# Patient Record
Sex: MALE | Race: WHITE | NOT HISPANIC OR LATINO | Employment: OTHER | URBAN - METROPOLITAN AREA
[De-identification: names, ages, dates, MRNs, and addresses within clinical notes are randomized per-mention and may not be internally consistent; named-entity substitution may affect disease eponyms.]

---

## 2020-12-21 ENCOUNTER — AMBULATORY - HEALTHEAST (OUTPATIENT)
Dept: SURGERY | Facility: HOSPITAL | Age: 75
End: 2020-12-21

## 2020-12-21 DIAGNOSIS — Z11.59 ENCOUNTER FOR SCREENING FOR OTHER VIRAL DISEASES: ICD-10-CM

## 2021-01-01 ENCOUNTER — LAB REQUISITION (OUTPATIENT)
Dept: LAB | Facility: HOSPITAL | Age: 76
End: 2021-01-01
Payer: MEDICARE

## 2021-01-01 ENCOUNTER — LAB REQUISITION (OUTPATIENT)
Dept: LAB | Facility: CLINIC | Age: 76
End: 2021-01-01
Payer: MEDICARE

## 2021-01-01 ENCOUNTER — LAB REQUISITION (OUTPATIENT)
Dept: LAB | Facility: CLINIC | Age: 76
End: 2021-01-01
Payer: COMMERCIAL

## 2021-01-01 ENCOUNTER — HOME INFUSION (PRE-WILLOW HOME INFUSION) (OUTPATIENT)
Dept: PHARMACY | Facility: CLINIC | Age: 76
End: 2021-01-01
Payer: COMMERCIAL

## 2021-01-01 ENCOUNTER — HEALTH MAINTENANCE LETTER (OUTPATIENT)
Age: 76
End: 2021-01-01

## 2021-01-01 ENCOUNTER — TELEPHONE (OUTPATIENT)
Dept: SURGERY | Facility: CLINIC | Age: 76
End: 2021-01-01

## 2021-01-01 ENCOUNTER — DOCUMENTATION ONLY (OUTPATIENT)
Dept: OTHER | Facility: CLINIC | Age: 76
End: 2021-01-01

## 2021-01-01 DIAGNOSIS — G45.9 TRANSIENT CEREBRAL ISCHEMIC ATTACK, UNSPECIFIED: ICD-10-CM

## 2021-01-01 DIAGNOSIS — I10 ESSENTIAL (PRIMARY) HYPERTENSION: ICD-10-CM

## 2021-01-01 DIAGNOSIS — I82.90 ACUTE EMBOLISM AND THROMBOSIS OF UNSPECIFIED VEIN: ICD-10-CM

## 2021-01-01 DIAGNOSIS — I48.20 CHRONIC ATRIAL FIBRILLATION, UNSPECIFIED (H): ICD-10-CM

## 2021-01-01 DIAGNOSIS — I82.401 ACUTE EMBOLISM AND THROMBOSIS OF UNSPECIFIED DEEP VEINS OF RIGHT LOWER EXTREMITY (H): ICD-10-CM

## 2021-01-01 DIAGNOSIS — R60.9 EDEMA, UNSPECIFIED: ICD-10-CM

## 2021-01-01 DIAGNOSIS — E87.70 FLUID OVERLOAD, UNSPECIFIED: ICD-10-CM

## 2021-01-01 DIAGNOSIS — E83.42 HYPOMAGNESEMIA: ICD-10-CM

## 2021-01-01 DIAGNOSIS — R53.1 WEAKNESS: ICD-10-CM

## 2021-01-01 LAB
ANION GAP SERPL CALCULATED.3IONS-SCNC: 13 MMOL/L (ref 5–18)
ANION GAP SERPL CALCULATED.3IONS-SCNC: 7 MMOL/L (ref 5–18)
ANION GAP SERPL CALCULATED.3IONS-SCNC: 8 MMOL/L (ref 5–18)
BASOPHILS # BLD AUTO: 0 10E3/UL (ref 0–0.2)
BASOPHILS NFR BLD AUTO: 1 %
BUN SERPL-MCNC: 12 MG/DL (ref 8–28)
BUN SERPL-MCNC: 16 MG/DL (ref 8–28)
BUN SERPL-MCNC: 20 MG/DL (ref 8–28)
CALCIUM SERPL-MCNC: 8.6 MG/DL (ref 8.5–10.5)
CALCIUM SERPL-MCNC: 8.7 MG/DL (ref 8.5–10.5)
CALCIUM SERPL-MCNC: 8.8 MG/DL (ref 8.5–10.5)
CHLORIDE BLD-SCNC: 101 MMOL/L (ref 98–107)
CHLORIDE BLD-SCNC: 104 MMOL/L (ref 98–107)
CHLORIDE BLD-SCNC: 96 MMOL/L (ref 98–107)
CO2 SERPL-SCNC: 27 MMOL/L (ref 22–31)
CO2 SERPL-SCNC: 28 MMOL/L (ref 22–31)
CO2 SERPL-SCNC: 29 MMOL/L (ref 22–31)
CREAT SERPL-MCNC: 0.66 MG/DL (ref 0.7–1.3)
CREAT SERPL-MCNC: 0.7 MG/DL (ref 0.7–1.3)
CREAT SERPL-MCNC: 0.77 MG/DL (ref 0.7–1.3)
EOSINOPHIL # BLD AUTO: 0.4 10E3/UL (ref 0–0.7)
EOSINOPHIL NFR BLD AUTO: 8 %
ERYTHROCYTE [DISTWIDTH] IN BLOOD BY AUTOMATED COUNT: 14.6 % (ref 10–15)
ERYTHROCYTE [DISTWIDTH] IN BLOOD BY AUTOMATED COUNT: 15.3 % (ref 10–15)
ERYTHROCYTE [DISTWIDTH] IN BLOOD BY AUTOMATED COUNT: 15.9 % (ref 10–15)
GFR SERPL CREATININE-BSD FRML MDRD: 89 ML/MIN/1.73M2
GFR SERPL CREATININE-BSD FRML MDRD: >90 ML/MIN/1.73M2
GFR SERPL CREATININE-BSD FRML MDRD: >90 ML/MIN/1.73M2
GLUCOSE BLD-MCNC: 100 MG/DL (ref 70–125)
GLUCOSE BLD-MCNC: 85 MG/DL (ref 70–125)
GLUCOSE BLD-MCNC: 85 MG/DL (ref 70–125)
HCT VFR BLD AUTO: 26.5 % (ref 40–53)
HCT VFR BLD AUTO: 31.2 % (ref 40–53)
HCT VFR BLD AUTO: 36 % (ref 40–53)
HGB BLD-MCNC: 11.3 G/DL (ref 13.3–17.7)
HGB BLD-MCNC: 8.1 G/DL (ref 13.3–17.7)
HGB BLD-MCNC: 9.7 G/DL (ref 13.3–17.7)
IMM GRANULOCYTES # BLD: 0 10E3/UL
IMM GRANULOCYTES NFR BLD: 0 %
INR PPP: 1.91 (ref 0.85–1.15)
INR PPP: 1.92 (ref 0.85–1.15)
INR PPP: 2.05 (ref 0.85–1.15)
INR PPP: 2.45 (ref 0.85–1.15)
INR PPP: 3.15 (ref 0.85–1.15)
INR PPP: 3.92 (ref 0.85–1.15)
LYMPHOCYTES # BLD AUTO: 1.9 10E3/UL (ref 0.8–5.3)
LYMPHOCYTES NFR BLD AUTO: 43 %
MAGNESIUM SERPL-MCNC: 2.3 MG/DL (ref 1.8–2.6)
MCH RBC QN AUTO: 29 PG (ref 26.5–33)
MCH RBC QN AUTO: 29.7 PG (ref 26.5–33)
MCH RBC QN AUTO: 29.8 PG (ref 26.5–33)
MCHC RBC AUTO-ENTMCNC: 30.6 G/DL (ref 31.5–36.5)
MCHC RBC AUTO-ENTMCNC: 31.1 G/DL (ref 31.5–36.5)
MCHC RBC AUTO-ENTMCNC: 31.4 G/DL (ref 31.5–36.5)
MCV RBC AUTO: 93 FL (ref 78–100)
MCV RBC AUTO: 95 FL (ref 78–100)
MCV RBC AUTO: 97 FL (ref 78–100)
MONOCYTES # BLD AUTO: 0.6 10E3/UL (ref 0–1.3)
MONOCYTES NFR BLD AUTO: 14 %
NEUTROPHILS # BLD AUTO: 1.5 10E3/UL (ref 1.6–8.3)
NEUTROPHILS NFR BLD AUTO: 34 %
NRBC # BLD AUTO: 0 10E3/UL
NRBC BLD AUTO-RTO: 0 /100
PLATELET # BLD AUTO: 239 10E3/UL (ref 150–450)
PLATELET # BLD AUTO: 311 10E3/UL (ref 150–450)
PLATELET # BLD AUTO: 319 10E3/UL (ref 150–450)
POTASSIUM BLD-SCNC: 3.5 MMOL/L (ref 3.5–5)
POTASSIUM BLD-SCNC: 3.9 MMOL/L (ref 3.5–5)
POTASSIUM BLD-SCNC: 4.2 MMOL/L (ref 3.5–5)
RBC # BLD AUTO: 2.72 10E6/UL (ref 4.4–5.9)
RBC # BLD AUTO: 3.27 10E6/UL (ref 4.4–5.9)
RBC # BLD AUTO: 3.89 10E6/UL (ref 4.4–5.9)
SODIUM SERPL-SCNC: 137 MMOL/L (ref 136–145)
SODIUM SERPL-SCNC: 137 MMOL/L (ref 136–145)
SODIUM SERPL-SCNC: 139 MMOL/L (ref 136–145)
WBC # BLD AUTO: 4 10E3/UL (ref 4–11)
WBC # BLD AUTO: 4.5 10E3/UL (ref 4–11)
WBC # BLD AUTO: 4.9 10E3/UL (ref 4–11)

## 2021-01-01 PROCEDURE — 85610 PROTHROMBIN TIME: CPT | Mod: ORL | Performed by: FAMILY MEDICINE

## 2021-01-01 PROCEDURE — 85025 COMPLETE CBC W/AUTO DIFF WBC: CPT | Mod: ORL | Performed by: PHYSICAL MEDICINE & REHABILITATION

## 2021-01-01 PROCEDURE — 85027 COMPLETE CBC AUTOMATED: CPT | Mod: ORL | Performed by: PHYSICIAN ASSISTANT

## 2021-01-01 PROCEDURE — 36415 COLL VENOUS BLD VENIPUNCTURE: CPT | Mod: ORL | Performed by: PHYSICIAN ASSISTANT

## 2021-01-01 PROCEDURE — 80048 BASIC METABOLIC PNL TOTAL CA: CPT | Performed by: NURSE PRACTITIONER

## 2021-01-01 PROCEDURE — P9603 ONE-WAY ALLOW PRORATED MILES: HCPCS | Mod: ORL | Performed by: PHYSICIAN ASSISTANT

## 2021-01-01 PROCEDURE — P9604 ONE-WAY ALLOW PRORATED TRIP: HCPCS | Mod: ORL | Performed by: PHYSICAL MEDICINE & REHABILITATION

## 2021-01-01 PROCEDURE — 85610 PROTHROMBIN TIME: CPT | Mod: ORL | Performed by: NURSE PRACTITIONER

## 2021-01-01 PROCEDURE — 85027 COMPLETE CBC AUTOMATED: CPT | Performed by: NURSE PRACTITIONER

## 2021-01-01 PROCEDURE — 36415 COLL VENOUS BLD VENIPUNCTURE: CPT | Mod: ORL | Performed by: FAMILY MEDICINE

## 2021-01-01 PROCEDURE — P9604 ONE-WAY ALLOW PRORATED TRIP: HCPCS | Mod: ORL | Performed by: PHYSICIAN ASSISTANT

## 2021-01-01 PROCEDURE — P9603 ONE-WAY ALLOW PRORATED MILES: HCPCS | Performed by: NURSE PRACTITIONER

## 2021-01-01 PROCEDURE — P9603 ONE-WAY ALLOW PRORATED MILES: HCPCS | Mod: ORL | Performed by: FAMILY MEDICINE

## 2021-01-01 PROCEDURE — 36415 COLL VENOUS BLD VENIPUNCTURE: CPT | Performed by: NURSE PRACTITIONER

## 2021-01-01 PROCEDURE — 80048 BASIC METABOLIC PNL TOTAL CA: CPT | Mod: ORL | Performed by: PHYSICIAN ASSISTANT

## 2021-01-01 PROCEDURE — 36415 COLL VENOUS BLD VENIPUNCTURE: CPT | Mod: ORL | Performed by: NURSE PRACTITIONER

## 2021-01-01 PROCEDURE — 85610 PROTHROMBIN TIME: CPT | Mod: ORL | Performed by: PHYSICIAN ASSISTANT

## 2021-01-01 PROCEDURE — 80048 BASIC METABOLIC PNL TOTAL CA: CPT | Mod: ORL | Performed by: PHYSICAL MEDICINE & REHABILITATION

## 2021-01-01 PROCEDURE — 36415 COLL VENOUS BLD VENIPUNCTURE: CPT | Performed by: PHYSICAL MEDICINE & REHABILITATION

## 2021-01-01 PROCEDURE — 36415 COLL VENOUS BLD VENIPUNCTURE: CPT | Mod: ORL | Performed by: PHYSICAL MEDICINE & REHABILITATION

## 2021-01-01 PROCEDURE — 83735 ASSAY OF MAGNESIUM: CPT | Mod: ORL | Performed by: PHYSICAL MEDICINE & REHABILITATION

## 2021-01-01 PROCEDURE — P9603 ONE-WAY ALLOW PRORATED MILES: HCPCS | Mod: ORL | Performed by: NURSE PRACTITIONER

## 2021-01-04 ENCOUNTER — AMBULATORY - HEALTHEAST (OUTPATIENT)
Dept: MULTI SPECIALTY CLINIC | Facility: CLINIC | Age: 76
End: 2021-01-04

## 2021-01-04 LAB
CREAT SERPL-MCNC: 0.98 MG/DL (ref 0.72–1.25)
GFR ESTIMATE EXT - HISTORICAL: >60 ML/MIN/1.73M2
GFR ESTIMATE, IF BLACK EXT - HISTORICAL: >60 ML/MIN/1.73M2

## 2021-01-12 ENCOUNTER — AMBULATORY - HEALTHEAST (OUTPATIENT)
Dept: FAMILY MEDICINE | Facility: CLINIC | Age: 76
End: 2021-01-12

## 2021-01-12 DIAGNOSIS — Z11.59 ENCOUNTER FOR SCREENING FOR OTHER VIRAL DISEASES: ICD-10-CM

## 2021-01-13 ENCOUNTER — COMMUNICATION - HEALTHEAST (OUTPATIENT)
Dept: SCHEDULING | Facility: CLINIC | Age: 76
End: 2021-01-13

## 2021-01-13 LAB
SARS-COV-2 PCR COMMENT: NORMAL
SARS-COV-2 RNA SPEC QL NAA+PROBE: NEGATIVE
SARS-COV-2 VIRUS SPECIMEN SOURCE: NORMAL

## 2021-01-14 ENCOUNTER — ANESTHESIA - HEALTHEAST (OUTPATIENT)
Dept: SURGERY | Facility: HOSPITAL | Age: 76
End: 2021-01-14

## 2021-01-14 ASSESSMENT — MIFFLIN-ST. JEOR: SCORE: 2288.75

## 2021-01-15 ENCOUNTER — SURGERY - HEALTHEAST (OUTPATIENT)
Dept: SURGERY | Facility: HOSPITAL | Age: 76
End: 2021-01-15

## 2021-05-01 ENCOUNTER — HEALTH MAINTENANCE LETTER (OUTPATIENT)
Age: 76
End: 2021-05-01

## 2021-06-05 VITALS — BODY MASS INDEX: 50.62 KG/M2 | HEIGHT: 66 IN | WEIGHT: 315 LBS

## 2021-06-14 NOTE — ANESTHESIA PREPROCEDURE EVALUATION
Anesthesia Evaluation      Patient summary reviewed     Airway   Mallampati: I  Neck ROM: full   Pulmonary - negative ROS and normal exam                          Cardiovascular - normal exam  (+) hypertension, ,      Neuro/Psych - negative ROS     Endo/Other    (+) obesity (BMI > 57),      GI/Hepatic/Renal - negative ROS           Dental - normal exam                        Anesthesia Plan  Planned anesthetic: general endotracheal    COVID neg 1/12  ASA 3     Anesthetic plan and risks discussed with: patient    Post-op plan: routine recovery

## 2021-06-14 NOTE — ANESTHESIA POSTPROCEDURE EVALUATION
Patient: Raza Wilson  Procedure(s):  ENDOSCOPIC ULTRASOUND  ENDOSCOPIC RETROGRADE CHOLANGIOPANCREATOGRAPHY, BILLARY SPHINCTEROTOMY, STONE EXTRACTION  Anesthesia type: general    Patient location: PACU  Last vitals:   Vitals Value Taken Time   /60 01/15/21 0930   Temp 36.2  C (97.2  F) 01/15/21 0930   Pulse 55 01/15/21 0931   Resp 18 01/15/21 0931   SpO2 94 % 01/15/21 0931   Vitals shown include unvalidated device data.  Post vital signs: stable  Level of consciousness: awake and responds to simple questions  Post-anesthesia pain: pain controlled  Post-anesthesia nausea and vomiting: no  Pulmonary: unassisted, return to baseline  Cardiovascular: stable and blood pressure at baseline  Hydration: adequate  Anesthetic events: no    QCDR Measures:  ASA# 11 - Miriam-op Cardiac Arrest: ASA11B - Patient did NOT experience unanticipated cardiac arrest  ASA# 12 - Miriam-op Mortality Rate: ASA12B - Patient did NOT die  ASA# 13 - PACU Re-Intubation Rate: ASA13B - Patient did NOT require a new airway mgmt  ASA# 10 - Composite Anes Safety: ASA10A - No serious adverse event    Additional Notes:

## 2021-07-03 NOTE — ADDENDUM NOTE
Addendum Note by Margarita Rubi CRNA at 1/15/2021 12:26 PM     Author: Margarita Rubi CRNA Service: -- Author Type: Nurse Anesthetist    Filed: 1/15/2021 12:26 PM Date of Service: 1/15/2021 12:26 PM Status: Signed    : Margarita Rubi CRNA (Nurse Anesthetist)       Addendum  created 01/15/21 1226 by Margarita Rubi CRNA    Intraprocedure Meds edited, Orders acknowledged in Narrator

## 2021-07-31 ENCOUNTER — HOSPITAL ENCOUNTER (INPATIENT)
Facility: HOSPITAL | Age: 76
LOS: 6 days | Discharge: NURSING, REHAB OR LONG-TERM CARE | DRG: 418 | End: 2021-08-06
Attending: EMERGENCY MEDICINE | Admitting: EMERGENCY MEDICINE
Payer: MEDICARE

## 2021-07-31 ENCOUNTER — APPOINTMENT (OUTPATIENT)
Dept: CT IMAGING | Facility: HOSPITAL | Age: 76
DRG: 418 | End: 2021-07-31
Attending: EMERGENCY MEDICINE
Payer: MEDICARE

## 2021-07-31 ENCOUNTER — APPOINTMENT (OUTPATIENT)
Dept: RADIOLOGY | Facility: HOSPITAL | Age: 76
DRG: 418 | End: 2021-07-31
Attending: EMERGENCY MEDICINE
Payer: MEDICARE

## 2021-07-31 ENCOUNTER — APPOINTMENT (OUTPATIENT)
Dept: ULTRASOUND IMAGING | Facility: HOSPITAL | Age: 76
DRG: 418 | End: 2021-07-31
Attending: EMERGENCY MEDICINE
Payer: MEDICARE

## 2021-07-31 DIAGNOSIS — K81.0 ACUTE CHOLECYSTITIS: ICD-10-CM

## 2021-07-31 DIAGNOSIS — K83.09 ASCENDING CHOLANGITIS (H): ICD-10-CM

## 2021-07-31 LAB
ABO/RH(D): NORMAL
ABO/RH(D): NORMAL
ALBUMIN SERPL-MCNC: 3 G/DL (ref 3.5–5)
ALP SERPL-CCNC: 416 U/L (ref 45–120)
ALT SERPL W P-5'-P-CCNC: 159 U/L (ref 0–45)
ANION GAP SERPL CALCULATED.3IONS-SCNC: 14 MMOL/L (ref 5–18)
ANTIBODY SCREEN: NEGATIVE
AST SERPL W P-5'-P-CCNC: 193 U/L (ref 0–40)
BASOPHILS # BLD AUTO: 0 10E3/UL (ref 0–0.2)
BASOPHILS NFR BLD AUTO: 0 %
BILIRUB SERPL-MCNC: 3 MG/DL (ref 0–1)
BNP SERPL-MCNC: 22 PG/ML (ref 0–76)
BUN SERPL-MCNC: 16 MG/DL (ref 8–28)
CALCIUM SERPL-MCNC: 9.4 MG/DL (ref 8.5–10.5)
CHLORIDE BLD-SCNC: 97 MMOL/L (ref 98–107)
CO2 SERPL-SCNC: 25 MMOL/L (ref 22–31)
CREAT SERPL-MCNC: 0.98 MG/DL (ref 0.7–1.3)
EOSINOPHIL # BLD AUTO: 0.1 10E3/UL (ref 0–0.7)
EOSINOPHIL NFR BLD AUTO: 0 %
ERYTHROCYTE [DISTWIDTH] IN BLOOD BY AUTOMATED COUNT: 13.2 % (ref 10–15)
GFR SERPL CREATININE-BSD FRML MDRD: 75 ML/MIN/1.73M2
GLUCOSE BLD-MCNC: 164 MG/DL (ref 70–125)
HCT VFR BLD AUTO: 45.6 % (ref 40–53)
HGB BLD-MCNC: 14.7 G/DL (ref 13.3–17.7)
IMM GRANULOCYTES # BLD: 0.1 10E3/UL
IMM GRANULOCYTES NFR BLD: 1 %
INR PPP: 1.13 (ref 0.9–1.15)
LACTATE SERPL-SCNC: 2 MMOL/L (ref 0.7–2)
LYMPHOCYTES # BLD AUTO: 1.6 10E3/UL (ref 0.8–5.3)
LYMPHOCYTES NFR BLD AUTO: 10 %
MCH RBC QN AUTO: 29.9 PG (ref 26.5–33)
MCHC RBC AUTO-ENTMCNC: 32.2 G/DL (ref 31.5–36.5)
MCV RBC AUTO: 93 FL (ref 78–100)
MONOCYTES # BLD AUTO: 1 10E3/UL (ref 0–1.3)
MONOCYTES NFR BLD AUTO: 6 %
NEUTROPHILS # BLD AUTO: 13.5 10E3/UL (ref 1.6–8.3)
NEUTROPHILS NFR BLD AUTO: 83 %
NRBC # BLD AUTO: 0 10E3/UL
NRBC BLD AUTO-RTO: 0 /100
PLATELET # BLD AUTO: 259 10E3/UL (ref 150–450)
POTASSIUM BLD-SCNC: 3.7 MMOL/L (ref 3.5–5)
PROT SERPL-MCNC: 7.7 G/DL (ref 6–8)
RBC # BLD AUTO: 4.92 10E6/UL (ref 4.4–5.9)
SARS-COV-2 RNA RESP QL NAA+PROBE: NEGATIVE
SODIUM SERPL-SCNC: 136 MMOL/L (ref 136–145)
SPECIMEN EXPIRATION DATE: NORMAL
SPECIMEN EXPIRATION DATE: NORMAL
TROPONIN I SERPL-MCNC: <0.01 NG/ML (ref 0–0.29)
WBC # BLD AUTO: 16.3 10E3/UL (ref 4–11)

## 2021-07-31 PROCEDURE — 258N000003 HC RX IP 258 OP 636: Performed by: EMERGENCY MEDICINE

## 2021-07-31 PROCEDURE — G0378 HOSPITAL OBSERVATION PER HR: HCPCS

## 2021-07-31 PROCEDURE — 80053 COMPREHEN METABOLIC PANEL: CPT | Performed by: EMERGENCY MEDICINE

## 2021-07-31 PROCEDURE — 76705 ECHO EXAM OF ABDOMEN: CPT

## 2021-07-31 PROCEDURE — 99207 PR CDG-MDM COMPONENT: MEETS MODERATE - DOWN CODED: CPT | Performed by: EMERGENCY MEDICINE

## 2021-07-31 PROCEDURE — 96375 TX/PRO/DX INJ NEW DRUG ADDON: CPT

## 2021-07-31 PROCEDURE — 96365 THER/PROPH/DIAG IV INF INIT: CPT | Mod: 59

## 2021-07-31 PROCEDURE — 84484 ASSAY OF TROPONIN QUANT: CPT | Performed by: EMERGENCY MEDICINE

## 2021-07-31 PROCEDURE — 96376 TX/PRO/DX INJ SAME DRUG ADON: CPT

## 2021-07-31 PROCEDURE — 83605 ASSAY OF LACTIC ACID: CPT | Performed by: EMERGENCY MEDICINE

## 2021-07-31 PROCEDURE — 99222 1ST HOSP IP/OBS MODERATE 55: CPT | Mod: AI | Performed by: EMERGENCY MEDICINE

## 2021-07-31 PROCEDURE — 120N000001 HC R&B MED SURG/OB

## 2021-07-31 PROCEDURE — 99222 1ST HOSP IP/OBS MODERATE 55: CPT | Mod: 57 | Performed by: SURGERY

## 2021-07-31 PROCEDURE — 36415 COLL VENOUS BLD VENIPUNCTURE: CPT | Performed by: EMERGENCY MEDICINE

## 2021-07-31 PROCEDURE — 250N000011 HC RX IP 250 OP 636: Performed by: EMERGENCY MEDICINE

## 2021-07-31 PROCEDURE — 71045 X-RAY EXAM CHEST 1 VIEW: CPT

## 2021-07-31 PROCEDURE — 86900 BLOOD TYPING SEROLOGIC ABO: CPT | Performed by: EMERGENCY MEDICINE

## 2021-07-31 PROCEDURE — 85610 PROTHROMBIN TIME: CPT | Performed by: EMERGENCY MEDICINE

## 2021-07-31 PROCEDURE — 99285 EMERGENCY DEPT VISIT HI MDM: CPT | Mod: 25

## 2021-07-31 PROCEDURE — 85025 COMPLETE CBC W/AUTO DIFF WBC: CPT | Performed by: EMERGENCY MEDICINE

## 2021-07-31 PROCEDURE — 93005 ELECTROCARDIOGRAM TRACING: CPT | Performed by: EMERGENCY MEDICINE

## 2021-07-31 PROCEDURE — 96368 THER/DIAG CONCURRENT INF: CPT

## 2021-07-31 PROCEDURE — 83880 ASSAY OF NATRIURETIC PEPTIDE: CPT | Performed by: EMERGENCY MEDICINE

## 2021-07-31 PROCEDURE — 87635 SARS-COV-2 COVID-19 AMP PRB: CPT | Performed by: EMERGENCY MEDICINE

## 2021-07-31 PROCEDURE — C9803 HOPD COVID-19 SPEC COLLECT: HCPCS

## 2021-07-31 PROCEDURE — 74177 CT ABD & PELVIS W/CONTRAST: CPT

## 2021-07-31 RX ORDER — IOPAMIDOL 755 MG/ML
100 INJECTION, SOLUTION INTRAVASCULAR ONCE
Status: COMPLETED | OUTPATIENT
Start: 2021-07-31 | End: 2021-07-31

## 2021-07-31 RX ORDER — SODIUM CHLORIDE 9 MG/ML
INJECTION, SOLUTION INTRAVENOUS CONTINUOUS
Status: DISCONTINUED | OUTPATIENT
Start: 2021-07-31 | End: 2021-08-02

## 2021-07-31 RX ORDER — LEVOFLOXACIN 5 MG/ML
750 INJECTION, SOLUTION INTRAVENOUS ONCE
Status: COMPLETED | OUTPATIENT
Start: 2021-07-31 | End: 2021-07-31

## 2021-07-31 RX ORDER — LEVOFLOXACIN 5 MG/ML
750 INJECTION, SOLUTION INTRAVENOUS EVERY 24 HOURS
Status: DISCONTINUED | OUTPATIENT
Start: 2021-08-01 | End: 2021-08-03

## 2021-07-31 RX ORDER — TRIAMTERENE AND HYDROCHLOROTHIAZIDE 37.5; 25 MG/1; MG/1
1 CAPSULE ORAL DAILY
COMMUNITY

## 2021-07-31 RX ORDER — NALOXONE HYDROCHLORIDE 0.4 MG/ML
0.2 INJECTION, SOLUTION INTRAMUSCULAR; INTRAVENOUS; SUBCUTANEOUS
Status: DISCONTINUED | OUTPATIENT
Start: 2021-07-31 | End: 2021-08-06 | Stop reason: HOSPADM

## 2021-07-31 RX ORDER — LIDOCAINE 40 MG/G
CREAM TOPICAL
Status: DISCONTINUED | OUTPATIENT
Start: 2021-07-31 | End: 2021-08-06 | Stop reason: HOSPADM

## 2021-07-31 RX ORDER — HYDROMORPHONE HYDROCHLORIDE 1 MG/ML
0.5 INJECTION, SOLUTION INTRAMUSCULAR; INTRAVENOUS; SUBCUTANEOUS
Status: DISCONTINUED | OUTPATIENT
Start: 2021-07-31 | End: 2021-08-06 | Stop reason: HOSPADM

## 2021-07-31 RX ORDER — NALOXONE HYDROCHLORIDE 0.4 MG/ML
0.4 INJECTION, SOLUTION INTRAMUSCULAR; INTRAVENOUS; SUBCUTANEOUS
Status: DISCONTINUED | OUTPATIENT
Start: 2021-07-31 | End: 2021-08-06 | Stop reason: HOSPADM

## 2021-07-31 RX ORDER — ACETAMINOPHEN 500 MG
TABLET ORAL EVERY 6 HOURS PRN
Status: ON HOLD | COMMUNITY
End: 2021-08-04

## 2021-07-31 RX ADMIN — LEVOFLOXACIN 750 MG: 5 INJECTION, SOLUTION INTRAVENOUS at 16:34

## 2021-07-31 RX ADMIN — HYDROMORPHONE HYDROCHLORIDE 1 MG: 1 INJECTION, SOLUTION INTRAMUSCULAR; INTRAVENOUS; SUBCUTANEOUS at 11:30

## 2021-07-31 RX ADMIN — HYDROMORPHONE HYDROCHLORIDE 1 MG: 1 INJECTION, SOLUTION INTRAMUSCULAR; INTRAVENOUS; SUBCUTANEOUS at 13:19

## 2021-07-31 RX ADMIN — METRONIDAZOLE 500 MG: 500 INJECTION, SOLUTION INTRAVENOUS at 15:29

## 2021-07-31 RX ADMIN — SODIUM CHLORIDE, PRESERVATIVE FREE: 5 INJECTION INTRAVENOUS at 18:10

## 2021-07-31 RX ADMIN — IOPAMIDOL 100 ML: 755 INJECTION, SOLUTION INTRAVENOUS at 14:03

## 2021-07-31 ASSESSMENT — ACTIVITIES OF DAILY LIVING (ADL)
EQUIPMENT_CURRENTLY_USED_AT_HOME: CANE, STRAIGHT;WALKER, STANDARD
DOING_ERRANDS_INDEPENDENTLY_DIFFICULTY: NO

## 2021-07-31 NOTE — ED PROVIDER NOTES
ED SIGNOUT  Date/Time:7/31/2021 2:05 PM    Patient signed out to me by my colleague, Dr. Arndt.  Please see their note for complete history and physical. Plan to follow up on CT and ultrasound results.      The creation of this record is based on the scribe s observations of the work being performed by Dr. Minda Cardoso and the provider s statements to them. It was created on their behalf by Kelvin San a trained medical scribe. This document has been checked and approved by the attending provider.      REMAINING ED WORKUP:  CT abdomen pelvis, RUQ ultrasound.  Vitals:  /66   Pulse 83   Temp 98.8  F (37.1  C)   Resp 11   SpO2 94%   Physical Exam  Constitutional:       General: He is not in acute distress.  HENT:      Head: Normocephalic and atraumatic.      Mouth/Throat:      Pharynx: Oropharynx is clear.   Eyes:      Pupils: Pupils are equal, round, and reactive to light.   Cardiovascular:      Rate and Rhythm: Normal rate and regular rhythm.      Pulses: Normal pulses.      Heart sounds: Normal heart sounds.   Pulmonary:      Effort: Pulmonary effort is normal.      Breath sounds: Normal breath sounds.   Abdominal:      General: Abdomen is flat. Bowel sounds are normal.      Palpations: Abdomen is soft.      Tenderness: There is abdominal tenderness in the epigastric area.      Hernia: A hernia is present. Hernia is present in the umbilical area (easily reducible).   Musculoskeletal:         General: Normal range of motion.   Skin:     General: Skin is warm and dry.      Capillary Refill: Capillary refill takes less than 2 seconds.   Neurological:      General: No focal deficit present.      Mental Status: He is alert and oriented to person, place, and time.         Pertinent labs results reviewed   Results for orders placed or performed during the hospital encounter of 07/31/21   CT Abdomen Pelvis w Contrast    Impression    IMPRESSION:     1.  Acute cholecystitis with marked and diffuse wall  thickening, mucosal hyperenhancement extending to the cystic duct and pericholecystic inflammatory stranding. No calcified gallstones.  2.  Dilated common bile duct measuring up to 9 mm. No common duct stones are detected.  3.  Fat-containing umbilical hernia without associated inflammation.  4.  Distal descending and sigmoid diverticulosis but no diverticulitis.   XR Chest Port 1 View    Impression    IMPRESSION: Heart size is magnified in AP projection with normal vascularity. Shallow inspiration. No focal consolidation, pneumothorax nor pleural effusion.   Abdomen US, limited (RUQ only)    Impression    IMPRESSION:    1.  Cholelithiasis/biliary sludge and multiple findings consistent with acute cholecystitis.  2.  Dilated common bile duct measuring up to 10 mm. Distal common bile duct is obscured. No choledocholithiasis within the imaged CBD. Possible concurrent ascending cholangitis.   Comprehensive metabolic panel   Result Value Ref Range    Sodium 136 136 - 145 mmol/L    Potassium 3.7 3.5 - 5.0 mmol/L    Chloride 97 (L) 98 - 107 mmol/L    Carbon Dioxide (CO2) 25 22 - 31 mmol/L    Anion Gap 14 5 - 18 mmol/L    Urea Nitrogen 16 8 - 28 mg/dL    Creatinine 0.98 0.70 - 1.30 mg/dL    Calcium 9.4 8.5 - 10.5 mg/dL    Glucose 164 (H) 70 - 125 mg/dL    Alkaline Phosphatase 416 (H) 45 - 120 U/L     (H) 0 - 40 U/L     (H) 0 - 45 U/L    Protein Total 7.7 6.0 - 8.0 g/dL    Albumin 3.0 (L) 3.5 - 5.0 g/dL    Bilirubin Total 3.0 (H) 0.0 - 1.0 mg/dL    GFR Estimate 75 >60 mL/min/1.73m2   Result Value Ref Range    Troponin I <0.01 0.00 - 0.29 ng/mL   B-Type Natriuretic Peptide ([facility] Only)   Result Value Ref Range    BNP 22 0 - 76 pg/mL   Lactic acid whole blood   Result Value Ref Range    Lactic Acid 2.0 0.7 - 2.0 mmol/L   Result Value Ref Range    INR 1.13 0.90 - 1.15   CBC with platelets and differential   Result Value Ref Range    WBC Count 16.3 (H) 4.0 - 11.0 10e3/uL    RBC Count 4.92 4.40 - 5.90 10e6/uL     Hemoglobin 14.7 13.3 - 17.7 g/dL    Hematocrit 45.6 40.0 - 53.0 %    MCV 93 78 - 100 fL    MCH 29.9 26.5 - 33.0 pg    MCHC 32.2 31.5 - 36.5 g/dL    RDW 13.2 10.0 - 15.0 %    Platelet Count 259 150 - 450 10e3/uL    % Neutrophils 83 %    % Lymphocytes 10 %    % Monocytes 6 %    % Eosinophils 0 %    % Basophils 0 %    % Immature Granulocytes 1 %    NRBCs per 100 WBC 0 <1 /100    Absolute Neutrophils 13.5 (H) 1.6 - 8.3 10e3/uL    Absolute Lymphocytes 1.6 0.8 - 5.3 10e3/uL    Absolute Monocytes 1.0 0.0 - 1.3 10e3/uL    Absolute Eosinophils 0.1 0.0 - 0.7 10e3/uL    Absolute Basophils 0.0 0.0 - 0.2 10e3/uL    Absolute Immature Granulocytes 0.1 (H) <=0.0 10e3/uL    Absolute NRBCs 0.0 10e3/uL   SARS-COV2 (COVID-19) Virus RT-PCR    Specimen: Nasopharyngeal; Swab   Result Value Ref Range    SARS CoV2 PCR Negative Negative   Adult Type and Screen   Result Value Ref Range    ABO/RH(D) O POS     Antibody Screen Negative Negative    SPECIMEN EXPIRATION DATE [id number]    ABO and Rh   Result Value Ref Range    ABO/RH(D) O POS     SPECIMEN EXPIRATION DATE [id number]        Pertinent imaging reviewed   Please see official radiology report.  Abdomen US, limited (RUQ only)   Final Result   IMPRESSION:      1.  Cholelithiasis/biliary sludge and multiple findings consistent with acute cholecystitis.   2.  Dilated common bile duct measuring up to 10 mm. Distal common bile duct is obscured. No choledocholithiasis within the imaged CBD. Possible concurrent ascending cholangitis.      CT Abdomen Pelvis w Contrast   Final Result   IMPRESSION:       1.  Acute cholecystitis with marked and diffuse wall thickening, mucosal hyperenhancement extending to the cystic duct and pericholecystic inflammatory stranding. No calcified gallstones.   2.  Dilated common bile duct measuring up to 9 mm. No common duct stones are detected.   3.  Fat-containing umbilical hernia without associated inflammation.   4.  Distal descending and sigmoid  diverticulosis but no diverticulitis.      XR Chest Port 1 View   Final Result   IMPRESSION: Heart size is magnified in AP projection with normal vascularity. Shallow inspiration. No focal consolidation, pneumothorax nor pleural effusion.           Interventions  Medications   HYDROmorphone (DILAUDID) injection 1 mg (1 mg Intravenous Given 7/31/21 1130)   HYDROmorphone (DILAUDID) injection 1 mg (1 mg Intravenous Given 7/31/21 1319)   iopamidol (ISOVUE-370) solution 100 mL (100 mLs Intravenous Given 7/31/21 1403)        ED Course/MDM:  2:05 PM Signout accepted from Dr. Ardnt.  Prior records were reviewed.  Diagnostics from this visit are reviewed.  2:55 PM I introduced myself to the patient. Patient's pain has improved after IV Dilaudid. Updated him with results and plan to proceed to the OR.   3:16 PM I discussed the case with Dr. Ba from general surgery.   3:22 PM I discussed the case with Dr. Kern from GI.   4:10 PM I discussed the patient with Dr. San from the hospitalist service who agrees to admit the patient.         1. Acute cholecystitis    2. Ascending cholangitis          Dr. Minda Cardoso DO  [facility] Emergency Department        Minda Cardoso DO  07/31/21 7486

## 2021-07-31 NOTE — CONSULTS
General surgery consult         ASSESSMENT     1. Acute cholecystitis    2. Ascending cholangitis    76-year-old gentleman having had troubles with choledocholithiasis in the past is now back with evidence of cholecystitis and potential ascending cholangitis.  He needs a laparoscopic cholecystectomy and ERCP.         PLAN      Laparoscopic cholecystectomy and ERCP         CHIEF COMPLAINT     Chief Complaint   Patient presents with     Abdominal Pain         HPI     Raza Wilson is a 76 year old male who presents severe epigastric abdominal pain that started at 10:30 on the morning of July 31.  The patient was unable to get comfortable with the pain he was experiencing.    This patient is status post choledocholithiasis and an ERCP earlier this year.  His imaging shows gallstones and potential for recurrent choledocholithiasis.  This patient has a past medical history significant for choledocholithiasis, Crohn's disease hypertension and morbid obesity.,          PAST MEDICAL HISTORY SURGICAL HISTORY     Past Medical History:   Diagnosis Date     Abnormal involuntary movements      Achilles bursitis      BEVERLY (acute kidney injury) (H)      Crohn's colitis (H)      Edema      History of anesthesia complications     shivers and shakes after colonoscopy     Hypertension      Obesity     Past Surgical History:   Procedure Laterality Date     COLONOSCOPY       DC ERCP DX COLLECTION SPECIMEN BRUSHING/WASHING N/A 1/15/2021    Procedure: ENDOSCOPIC RETROGRADE CHOLANGIOPANCREATOGRAPHY, BILLARY SPHINCTEROTOMY, STONE EXTRACTION;  Surgeon: Syd Esteban MD;  [place] [facility] [place]  Service: Gastroenterology     DC ESOPHAGOGASTRODUODENOSCOPY US SCOPE W/ADJ STRXRS N/A 1/15/2021    Procedure: ENDOSCOPIC ULTRASOUND;  Surgeon: Syd Esteban MD;  [place] [facility] [place]  Service: G[place]enterology     TONSILLECTOMY & ADENOIDECTOMY       XR ERCP BILIARY ONLY  1/15/2021          CURRENT MEDICATIONS ALLERGIES      Current Outpatient Rx   Medication Sig Dispense Refill     acetaminophen (TYLENOL) 500 MG tablet Take 1,000-1,500 mg by mouth every 6 hours as needed for mild pain       aspirin 81 MG EC tablet [ASPIRIN 81 MG EC TABLET] Take 81 mg by mouth daily.       cholecalciferol, vitamin D3, 50 mcg (2,000 unit) Tab [CHOLECALCIFEROL, VITAMIN D3, 50 MCG (2,000 UNIT) TAB] Take 2 tablets by mouth daily.       inFLIXimab (REMICADE) 100 mg injection [INFLIXIMAB (REMICADE) 100 MG INJECTION] 7.5 mg/kg every 2 (two) months. Per GI       magnesium oxide (MAG-OX) 400 mg (241.3 mg magnesium) tablet [MAGNESIUM OXIDE (MAG-OX) 400 MG (241.3 MG MAGNESIUM) TABLET] Take 400 mg by mouth daily.       metoprolol succinate (TOPROL-XL) 100 MG 24 hr tablet [METOPROLOL SUCCINATE (TOPROL-XL) 100 MG 24 HR TABLET] Take 100 mg by mouth daily.       triamterene-HCTZ (DYAZIDE) 37.5-25 MG capsule Take 1 capsule by mouth daily      Allergies   Allergen Reactions     Lisinopril Cough     Penicillins Unknown     Trimethoprim Unknown     Bactrim [Sulfamethoxazole W/Trimethoprim] Rash          FAMILY HISTORY   History reviewed. No pertinent family history.      SOCIAL HISTORY     Social History     Socioeconomic History     Marital status:      Spouse name: None     Number of children: None     Years of education: None     Highest education level: None   Occupational History     None   Tobacco Use     Smoking status: Never Smoker     Smokeless tobacco: Never Used   Substance and Sexual Activity     Alcohol use: Not Currently     Drug use: Never     Sexual activity: None   Other Topics Concern     None   Social History Narrative     None     Social Determinants of Health     Financial Resource Strain:      Difficulty of Paying Living Expenses:    Food Insecurity:      Worried About Running Out of Food in the Last Year:      Ran Out of Food in the Last Year:    Transportation Needs:      Lack of Transportation (Medical):      Lack of Transportation  (Non-Medical):    Physical Activity:      Days of Exercise per Week:      Minutes of Exercise per Session:    Stress:      Feeling of Stress :    Social Connections:      Frequency of Communication with Friends and Family:      Frequency of Social Gatherings with Friends and Family:      Attends Hindu Services:      Active Member of Clubs or Organizations:      Attends Club or Organization Meetings:      Marital Status:    Intimate Partner Violence:      Fear of Current or Ex-Partner:      Emotionally Abused:      Physically Abused:      Sexually Abused:          REVIEW OF SYSTEMS       12 point review of systems are unremarkable except for the symptoms described in the HPI    PHYSICAL EXAM     VITAL SIGNS: BP (!) 153/85   Pulse 86   Temp 98.8  F (37.1  C)   Resp 12   SpO2 98%    General : Alert, cooperative, appears stated age, morbidly obese   Skin: Skin color, texture, turgor normal, no rashes or lesions   Lymph nodes: No obvious adenopathy   Head: Normocephalic, without obvious abnormality, atraumatic   HEENT: PERRL, conjunctiva/corneas clear, EOM's intact, no scleral icterus   Throat: Lips, mucosa, and tongue normal; teeth and gums normal    Neck: Supple, thyroid not enlarged   Back: No CVA tenderness   Lungs: Clear to auscultation bilaterally, respirations unlabored, no rales, rhonchi or wheezes   Chest Wall:No tenderness or deformity   Heart: Regular rate and rhythm, S1, S2 normal, no murmur, rub or gallop   Abdomen: Soft, non-tender, no guarding, + bowel sounds active,   Extremities : No obvious swelling,  Neurologic: Cranial Nerves II-XII normal, moves all extremities equally, no focal findings          RADIOLOGY      Abdomen US, limited (RU[facility] only)   Final Result   IMPRESSION:      1.  Cholelithiasis/biliary sludge and multiple findings consistent with acute cholecystitis.   2.  Dilated common bile duct measuring up to 10 mm. Distal common bile duct is obscured. No choledocholithiasis within the  imaged CBD. Possible concurrent ascending cholangitis.      CT Abdomen Pelvis w Contrast   Final Result   IMPRESSION:       1.  Acute cholecystitis with marked and diffuse wall thickening, mucosal hyperenhancement extending to the cystic duct and pericholecystic inflammatory stranding. No calcified gallstones.   2.  Dilated common bile duct measuring up to 9 mm. No common duct stones are detected.   3.  Fat-containing umbilical hernia without associated inflammation.   4.  Distal descending and sigmoid diverticulosis but no diverticulitis.      XR Chest Port 1 View   Final Result   IMPRESSION: Heart size is magnified in AP projection with normal vascularity. Shallow inspiration. No focal consolidation, pneumothorax nor pleural effusion.          EKG     Reviewed        LABS     Results for orders placed or performed during the hospital encounter of 07/31/21   CT Abdomen Pelvis w Contrast    Impression    IMPRESSION:     1.  Acute cholecystitis with marked and diffuse wall thickening, mucosal hyperenhancement extending to the cystic duct and pericholecystic inflammatory stranding. No calcified gallstones.  2.  Dilated common bile duct measuring up to 9 mm. No common duct stones are detected.  3.  Fat-containing umbilical hernia without associated inflammation.  4.  Distal descending and sigmoid diverticulosis but no diverticulitis.   XR Chest Port 1 View    Impression    IMPRESSION: Heart size is magnified in AP projection with normal vascularity. Shallow inspiration. No focal consolidation, pneumothorax nor pleural effusion.   Abdomen US, limited (RUQ only)    Impression    IMPRESSION:    1.  Cholelithiasis/biliary sludge and multiple findings consistent with acute cholecystitis.  2.  Dilated common bile duct measuring up to 10 mm. Distal common bile duct is obscured. No choledocholithiasis within the imaged CBD. Possible concurrent ascending cholangitis.   Comprehensive metabolic panel   Result Value Ref Range     Sodium 136 136 - 145 mmol/L    Potassium 3.7 3.5 - 5.0 mmol/L    Chloride 97 (L) 98 - 107 mmol/L    Carbon Dioxide (CO2) 25 22 - 31 mmol/L    Anion Gap 14 5 - 18 mmol/L    Urea Nitrogen 16 8 - 28 mg/dL    Creatinine 0.98 0.70 - 1.30 mg/dL    Calcium 9.4 8.5 - 10.5 mg/dL    Glucose 164 (H) 70 - 125 mg/dL    Alkaline Phosphatase 416 (H) 45 - 120 U/L     (H) 0 - 40 U/L     (H) 0 - 45 U/L    Protein Total 7.7 6.0 - 8.0 g/dL    Albumin 3.0 (L) 3.5 - 5.0 g/dL    Bilirubin Total 3.0 (H) 0.0 - 1.0 mg/dL    GFR Estimate 75 >60 mL/min/1.73m2   Result Value Ref Range    Troponin I <0.01 0.00 - 0.29 ng/mL   B-Type Natriuretic Peptide ([facility] Only)   Result Value Ref Range    BNP 22 0 - 76 pg/mL   Lactic acid whole blood   Result Value Ref Range    Lactic Acid 2.0 0.7 - 2.0 mmol/L   Result Value Ref Range    INR 1.13 0.90 - 1.15   CBC with platelets and differential   Result Value Ref Range    WBC Count 16.3 (H) 4.0 - 11.0 10e3/uL    RBC Count 4.92 4.40 - 5.90 10e6/uL    Hemoglobin 14.7 13.3 - 17.7 g/dL    Hematocrit 45.6 40.0 - 53.0 %    MCV 93 78 - 100 fL    MCH 29.9 26.5 - 33.0 pg    MCHC 32.2 31.5 - 36.5 g/dL    RDW 13.2 10.0 - 15.0 %    Platelet Count 259 150 - 450 10e3/uL    % Neutrophils 83 %    % Lymphocytes 10 %    % Monocytes 6 %    % Eosinophils 0 %    % Basophils 0 %    % Immature Granulocytes 1 %    NRBCs per 100 WBC 0 <1 /100    Absolute Neutrophils 13.5 (H) 1.6 - 8.3 10e3/uL    Absolute Lymphocytes 1.6 0.8 - 5.3 10e3/uL    Absolute Monocytes 1.0 0.0 - 1.3 10e3/uL    Absolute Eosinophils 0.1 0.0 - 0.7 10e3/uL    Absolute Basophils 0.0 0.0 - 0.2 10e3/uL    Absolute Immature Granulocytes 0.1 (H) <=0.0 10e3/uL    Absolute NRBCs 0.0 10e3/uL   SARS-COV2 (COVID-19) Virus RT-PCR    Specimen: Nasopharyngeal; Swab   Result Value Ref Range    SARS CoV2 PCR Negative Negative   Adult Type and Screen   Result Value Ref Range    ABO/RH(D) O POS     Antibody Screen Negative Negative    SPECIMEN EXPIRATION DATE  [id number]    ABO and Rh   Result Value Ref Range    ABO/RH(D) O POS     SPECIMEN EXPIRATION DATE [id number]            [facility]  [phone]

## 2021-07-31 NOTE — CONSULTS
CONSULTING PHYSICIAN   Garth San MD     REASON FOR CONSULTATION   abd pain     CHIEF COMPLAINT   Raza Wilson came to the hospital for evaluation of abd pain     HISTORY OF PRESENT ILLNESS   Raza Wilson is a pleasant 76 year old male with Crohns colitis, HTN admitted with abd pain    Patient notes RUQ radiating to his back  Nausea, no vomiting. No appetite   Feeling flushed but no fever    Has felt fatigued and run down for 3 days but pain occurred today    Had ERCP in 01/2021 with choledocholithiasis and s/p sphincterotomy. Declined CCY at that time    Labs here with bili at 3. CT with wall thickening and pericholecystic fluid     PAST HISTORY   Past Medical History:   Diagnosis Date     Abnormal involuntary movements      Achilles bursitis      BEVERLY (acute kidney injury) (H)      Crohn's colitis (H)      Edema      History of anesthesia complications     shivers and shakes after colonoscopy     Hypertension      Obesity       Past Surgical History:   Procedure Laterality Date     COLONOSCOPY       [place] ERCP DX COLLECTION SPECIMEN BRUSHING/WASHING N/A 1/15/2021    Procedure: ENDOSCOPIC RETROGRADE CHOLANGIOPANCREATOGRAPHY, BILLARY SPHINCTEROTOMY, STONE EXTRACTION;  Surgeon: Syd Esteban MD;  Location: [facility];  Service: Gastroenterology     CO ESOPHAGOGASTRODUODENOSCOPY US SCOPE W/ADJ STRXRS N/A 1/15/2021    Procedure: ENDOSCOPIC ULTRASOUND;  Surgeon: Syd Esteban MD;  Location: [facility];  Service: Gastroenterology     TONSILLECTOMY & ADENOIDECTOMY       XR ERCP BILIARY ONLY  1/15/2021        Family History Social History   History reviewed. No pertinent family history.   Occupation:     Marital Status:     Tobacco: None    Alcohol: None    Recreational Drugs: None     MEDICATIONS & ALLERGIES   Medications Prior to Admission   Medication Sig Dispense Refill Last Dose     acetaminophen (TYLENOL) 500 MG tablet Take 1,000-1,500 mg by  mouth every 6 hours as needed for mild pain   Past Week at Unknown time     aspirin 81 MG EC tablet [ASPIRIN 81 MG EC TABLET] Take 81 mg by mouth daily.   Past Week at Unknown time     cholecalciferol, vitamin D3, 50 mcg (2,000 unit) Tab [CHOLECALCIFEROL, VITAMIN D3, 50 MCG (2,000 UNIT) TAB] Take 2 tablets by mouth daily.   Past Week at Unknown time     inFLIXimab (REMICADE) 100 mg injection [INFLIXIMAB (REMICADE) 100 MG INJECTION] 7.5 mg/kg every 2 (two) months. Per GI   unknown     magnesium oxide (MAG-OX) 400 mg (241.3 mg magnesium) tablet [MAGNESIUM OXIDE (MAG-OX) 400 MG (241.3 MG MAGNESIUM) TABLET] Take 400 mg by mouth daily.   Past Week at Unknown time     metoprolol succinate (TOPROL-XL) 100 MG 24 hr tablet [METOPROLOL SUCCINATE (TOPROL-XL) 100 MG 24 HR TABLET] Take 100 mg by mouth daily.   Past Week at Unknown time     triamterene-HCTZ (DYAZIDE) 37.5-25 MG capsule Take 1 capsule by mouth daily   Past Week at Unknown time        ALLERGIES   Allergies   Allergen Reactions     Lisinopril Cough     Penicillins Unknown     Trimethoprim Unknown     Bactrim [Sulfamethoxazole W/Trimethoprim] Rash         REVIEW OF SYSTEMS   A comprehensive review of systems was performed and was otherwise noncontributory.     OBJECTIVE   Vitals Blood pressure (!) 153/85, pulse 86, temperature 98.8  F (37.1  C), resp. rate 12, SpO2 98 %.           Physical  Exam   GENERAL: alert and oriented, obese in mild apparent distress    SKIN: warm and dry, no rashes    HEENT: atraumatic, anicteric, moist mucous membranes, neck soft/supple     PULMONARY: normal resp effort, breath sounds clear to auscultation bilateral    CARDIOVASCULAR: normal rate and rhythm, no murmurs, no edema    ABDOMEN: + RUQ tenderness    MUSCULOSKELETAL: joints and gait normal    NEUROLOGICAL: appropriate mental status, grossly intact    PSYCHIATRIC: normal mood, affect and insight        LABORATORY    ELECTROLYTE PANEL   Recent Labs   Lab 07/31/21  1219       POTASSIUM 3.7   CHLORIDE 97*   CO2 25   *   CR 0.98   BUN 16      HEMATOLOGY PANEL   Recent Labs   Lab 07/31/21  1132   HGB 14.7   MCV 93   WBC 16.3*      INR 1.13      LIVER AND PANCREAS PANEL   Recent Labs   Lab 07/31/21  1219   *   *   ALKPHOS 416*   BILITOTAL 3.0*     IMAGING STUDIES    EXAM: CT ABDOMEN PELVIS W CONTRAST  LOCATION: [facility]  DATE/TIME: 7/31/2021 1:31 PM     INDICATION: Abdominal pain at umbilical hernia  COMPARISON: CT of the abdomen and pelvis without contrast 12/13/2016  TECHNIQUE: CT scan of the abdomen and pelvis was performed following injection of IV contrast. Multiplanar reformats were obtained. Dose reduction techniques were used.  CONTRAST: Isovue 370    100ml     FINDINGS:   LOWER CHEST: Symmetric bands of subpleural opacity in the dependent lower lobes consistent with positional atelectasis. Gynecomastia.     HEPATOBILIARY: Diffuse gallbladder wall thickening and inflammatory stranding in the pericholecystic fat. Gallbladder mucosal enhancement which extends to the cystic duct. There are no calcified gallstones. Common bile duct is mildly enlarged measuring 9   mm. No choledocholithiasis.     Diffuse fatty infiltration of liver. No liver lesions.     PANCREAS: Normal.     SPLEEN: Normal.     ADRENAL GLANDS: Stable hypoattenuating 21 x 27 mm left adrenal nodule consistent with an adrenal adenoma. The right adrenal gland is normal.     KIDNEYS/BLADDER: Kidneys are normal in size with symmetric enhancement. No nephrolithiasis or hydronephrosis. Urinary bladder is decompressed.     BOWEL: Small amount of fluid and air in the stomach without distention. Normal caliber small bowel. Colon is largely decompressed. No colonic wall thickening. Distal descending and sigmoid diverticula are present. No pericolonic inflammatory stranding.     LYMPH NODES: Normal.     VASCULATURE: Mild aortoiliac atheromatous calcifications. No aneurysm or  critical stenosis.     PELVIC ORGANS: Prostate gland is normal in size. Seminal vesicles are symmetric. No pelvic free fluid.     MUSCULOSKELETAL: Normal there is a fat-containing umbilical hernia. Large patient habitus. No associated edema/inflammation. Fairly diffuse mild to moderate lower thoracic and lumbar degenerative osteophytes and disc space narrowing. No aggressive or   destructive bone lesions are present.                                                                      IMPRESSION:      1.  Acute cholecystitis with marked and diffuse wall thickening, mucosal hyperenhancement extending to the cystic duct and pericholecystic inflammatory stranding. No calcified gallstones.  2.  Dilated common bile duct measuring up to 9 mm. No common duct stones are detected.  3.  Fat-containing umbilical hernia without associated inflammation.  4.  Distal descending and sigmoid diverticulosis but no diverticulitis.    EXAM: US ABDOMEN LIMITED  LOCATION: [facility]  DATE/TIME: 7/31/2021 1:54 PM     INDICATION: Transaminitis with Tbili 3.0  COMPARISON: Same day abdomen and pelvis CT  TECHNIQUE: Limited abdominal ultrasound.     FINDINGS:     GALLBLADDER: Layering biliary sludge sludge and/or small stones in the gallbladder lumen. Diffuse gallbladder wall thickening measuring 6 mm. There is pericholecystic inflammation.     BILE DUCTS: Common bile duct is dilated but no intrahepatic ductal dilation. The common duct measures 10 mm. Distal duct was obscured by bowel gas.     LIVER: Normal parenchyma with smooth contour. No focal mass.     RIGHT KIDNEY: No hydronephrosis.     PANCREAS: The pancreas is largely obscured by overlying gas.     No ascites.                                                                      IMPRESSION:     1.  Cholelithiasis/biliary sludge and multiple findings consistent with acute cholecystitis.  2.  Dilated common bile duct measuring up to 10 mm. Distal common bile  duct is obscured. No choledocholithiasis within the imaged CBD. Possible concurrent ascending cholangitis.    I have reviewed the current diagnostic and laboratory tests.           IMPRESSION   Raza Wilson is a pleasant 76 year old male with cholecystitis and elevated LFTs concerning for choledocholithiais    Choledocholithiaisis- suspected given LFT elevation. Patient is s/p sphincterotomy this year  Scheduled for CCY in AM  WIll plan ERCP to follow for duct clearance  NPO at [facility]  On levo/flagyl    Crohns- on infliximab              Isaiah Kern MD  Thank you for the opportunity to participate in the care of this patient.   Please feel free to call me with any questions or concerns.  Phone number [phone].

## 2021-07-31 NOTE — H&P (VIEW-ONLY)
CONSULTING PHYSICIAN   Garth San MD     REASON FOR CONSULTATION   abd pain     CHIEF COMPLAINT   Raza Wilson came to the hospital for evaluation of abd pain     HISTORY OF PRESENT ILLNESS   Raza Wilson is a pleasant 76 year old male with Crohns colitis, HTN admitted with abd pain    Patient notes RUQ radiating to his back  Nausea, no vomiting. No appetite   Feeling flushed but no fever    Has felt fatigued and run down for 3 days but pain occurred today    Had ERCP in 01/2021 with choledocholithiasis and s/p sphincterotomy. Declined CCY at that time    Labs here with bili at 3. CT with wall thickening and pericholecystic fluid     PAST HISTORY   Past Medical History:   Diagnosis Date     Abnormal involuntary movements      Achilles bursitis      BEVERLY (acute kidney injury) (H)      Crohn's colitis (H)      Edema      History of anesthesia complications     shivers and shakes after colonoscopy     Hypertension      Obesity       Past Surgical History:   Procedure Laterality Date     COLONOSCOPY       [place] ERCP DX COLLECTION SPECIMEN BRUSHING/WASHING N/A 1/15/2021    Procedure: ENDOSCOPIC RETROGRADE CHOLANGIOPANCREATOGRAPHY, BILLARY SPHINCTEROTOMY, STONE EXTRACTION;  Surgeon: Syd Esteban MD;  Location: [facility];  Service: Gastroenterology     [place] ESOPHAGOGASTRODUODENOSCOPY US SCOPE W/ADJ STRXRS N/A 1/15/2021    Procedure: ENDOSCOPIC ULTRASOUND;  Surgeon: Syd Esteban MD;  Location: [facility];  Service: Gastroenterology     TONSILLECTOMY & ADENOIDECTOMY       XR ERCP BILIARY ONLY  1/15/2021        Family History Social History   History reviewed. No pertinent family history.   Occupation:     Marital Status:     Tobacco: None    Alcohol: None    Recreational Drugs: None     MEDICATIONS & ALLERGIES   Medications Prior to Admission   Medication Sig Dispense Refill Last Dose     acetaminophen (TYLENOL) 500 MG tablet Take 1,000-1,500 mg by  mouth every 6 hours as needed for mild pain   Past Week at Unknown time     aspirin 81 MG EC tablet [ASPIRIN 81 MG EC TABLET] Take 81 mg by mouth daily.   Past Week at Unknown time     cholecalciferol, vitamin D3, 50 mcg (2,000 unit) Tab [CHOLECALCIFEROL, VITAMIN D3, 50 MCG (2,000 UNIT) TAB] Take 2 tablets by mouth daily.   Past Week at Unknown time     inFLIXimab (REMICADE) 100 mg injection [INFLIXIMAB (REMICADE) 100 MG INJECTION] 7.5 mg/kg every 2 (two) months. Per GI   unknown     magnesium oxide (MAG-OX) 400 mg (241.3 mg magnesium) tablet [MAGNESIUM OXIDE (MAG-OX) 400 MG (241.3 MG MAGNESIUM) TABLET] Take 400 mg by mouth daily.   Past Week at Unknown time     metoprolol succinate (TOPROL-XL) 100 MG 24 hr tablet [METOPROLOL SUCCINATE (TOPROL-XL) 100 MG 24 HR TABLET] Take 100 mg by mouth daily.   Past Week at Unknown time     triamterene-HCTZ (DYAZIDE) 37.5-25 MG capsule Take 1 capsule by mouth daily   Past Week at Unknown time        ALLERGIES   Allergies   Allergen Reactions     Lisinopril Cough     Penicillins Unknown     Trimethoprim Unknown     Bactrim [Sulfamethoxazole W/Trimethoprim] Rash         REVIEW OF SYSTEMS   A comprehensive review of systems was performed and was otherwise noncontributory.     OBJECTIVE   Vitals Blood pressure (!) 153/85, pulse 86, temperature 98.8  F (37.1  C), resp. rate 12, SpO2 98 %.           Physical  Exam   GENERAL: alert and oriented, obese in mild apparent distress    SKIN: warm and dry, no rashes    HEENT: atraumatic, anicteric, moist mucous membranes, neck soft/supple     PULMONARY: normal resp effort, breath sounds clear to auscultation bilateral    CARDIOVASCULAR: normal rate and rhythm, no murmurs, no edema    ABDOMEN: + RUQ tenderness    MUSCULOSKELETAL: joints and gait normal    NEUROLOGICAL: appropriate mental status, grossly intact    PSYCHIATRIC: normal mood, affect and insight        LABORATORY    ELECTROLYTE PANEL   Recent Labs   Lab 07/31/21  1219       POTASSIUM 3.7   CHLORIDE 97*   CO2 25   *   CR 0.98   BUN 16      HEMATOLOGY PANEL   Recent Labs   Lab 07/31/21  1132   HGB 14.7   MCV 93   WBC 16.3*      INR 1.13      LIVER AND PANCREAS PANEL   Recent Labs   Lab 07/31/21  1219   *   *   ALKPHOS 416*   BILITOTAL 3.0*     IMAGING STUDIES    EXAM: CT ABDOMEN PELVIS W CONTRAST  LOCATION: [facility]  DATE/TIME: 7/31/2021 1:31 PM     INDICATION: Abdominal pain at umbilical hernia  COMPARISON: CT of the abdomen and pelvis without contrast 12/13/2016  TECHNIQUE: CT scan of the abdomen and pelvis was performed following injection of IV contrast. Multiplanar reformats were obtained. Dose reduction techniques were used.  CONTRAST: Isovue 370    100ml     FINDINGS:   LOWER CHEST: Symmetric bands of subpleural opacity in the dependent lower lobes consistent with positional atelectasis. Gynecomastia.     HEPATOBILIARY: Diffuse gallbladder wall thickening and inflammatory stranding in the pericholecystic fat. Gallbladder mucosal enhancement which extends to the cystic duct. There are no calcified gallstones. Common bile duct is mildly enlarged measuring 9   mm. No choledocholithiasis.     Diffuse fatty infiltration of liver. No liver lesions.     PANCREAS: Normal.     SPLEEN: Normal.     ADRENAL GLANDS: Stable hypoattenuating 21 x 27 mm left adrenal nodule consistent with an adrenal adenoma. The right adrenal gland is normal.     KIDNEYS/BLADDER: Kidneys are normal in size with symmetric enhancement. No nephrolithiasis or hydronephrosis. Urinary bladder is decompressed.     BOWEL: Small amount of fluid and air in the stomach without distention. Normal caliber small bowel. Colon is largely decompressed. No colonic wall thickening. Distal descending and sigmoid diverticula are present. No pericolonic inflammatory stranding.     LYMPH NODES: Normal.     VASCULATURE: Mild aortoiliac atheromatous calcifications. No aneurysm or  critical stenosis.     PELVIC ORGANS: Prostate gland is normal in size. Seminal vesicles are symmetric. No pelvic free fluid.     MUSCULOSKELETAL: Normal there is a fat-containing umbilical hernia. Large patient habitus. No associated edema/inflammation. Fairly diffuse mild to moderate lower thoracic and lumbar degenerative osteophytes and disc space narrowing. No aggressive or   destructive bone lesions are present.                                                                      IMPRESSION:      1.  Acute cholecystitis with marked and diffuse wall thickening, mucosal hyperenhancement extending to the cystic duct and pericholecystic inflammatory stranding. No calcified gallstones.  2.  Dilated common bile duct measuring up to 9 mm. No common duct stones are detected.  3.  Fat-containing umbilical hernia without associated inflammation.  4.  Distal descending and sigmoid diverticulosis but no diverticulitis.    EXAM: US ABDOMEN LIMITED  LOCATION: [facility]  DATE/TIME: 7/31/2021 1:54 PM     INDICATION: Transaminitis with Tbili 3.0  COMPARISON: Same day abdomen and pelvis CT  TECHNIQUE: Limited abdominal ultrasound.     FINDINGS:     GALLBLADDER: Layering biliary sludge sludge and/or small stones in the gallbladder lumen. Diffuse gallbladder wall thickening measuring 6 mm. There is pericholecystic inflammation.     BILE DUCTS: Common bile duct is dilated but no intrahepatic ductal dilation. The common duct measures 10 mm. Distal duct was obscured by bowel gas.     LIVER: Normal parenchyma with smooth contour. No focal mass.     RIGHT KIDNEY: No hydronephrosis.     PANCREAS: The pancreas is largely obscured by overlying gas.     No ascites.                                                                      IMPRESSION:     1.  Cholelithiasis/biliary sludge and multiple findings consistent with acute cholecystitis.  2.  Dilated common bile duct measuring up to 10 mm. Distal common bile  duct is obscured. No choledocholithiasis within the imaged CBD. Possible concurrent ascending cholangitis.    I have reviewed the current diagnostic and laboratory tests.           IMPRESSION   Raza Wilson is a pleasant 76 year old male with cholecystitis and elevated LFTs concerning for choledocholithiais    Choledocholithiaisis- suspected given LFT elevation. Patient is s/p sphincterotomy this year  Scheduled for CCY in AM  WIll plan ERCP to follow for duct clearance  NPO at [facility]  On levo/flagyl    Crohns- on infliximab              Isaiah Kern MD  Thank you for the opportunity to participate in the care of this patient.   Please feel free to call me with any questions or concerns.  Phone number [phone].

## 2021-07-31 NOTE — ED PROVIDER NOTES
EMERGENCY DEPARTMENT ENCOUNTER      NAME: Raza Wilson  AGE: 76 year old male  YOB: 1945  MRN: [id number]  EVALUATION DATE & TIME: 7/31/2021 10:47 AM    PCP: No primary care provider on file.    ED PROVIDER: Sada Arndt M.D.      Chief Complaint   Patient presents with     Abdominal Pain         FINAL IMPRESSION:  1. Intractable pain    2. Incarcerated umbilical hernia          ED COURSE & MEDICAL DECISION MAKING:    ED Course as of Jul 31 1349   Sat Jul 31, 2021   1100 Pt with pain at superior umbilical region with hernia at site, unable to reduce on notable prolonged attempt in the ED. No NEW leg swelling or cough , no fever, no chest pain, pt breathing quickly with sat 95% RA with discomfort expressed as etiology of tachypnea per patient. Morphine ordered with screening troponin, BNP and EKG and CXR, CT abdomen pending with lactic acid, pt with quiet bowel sounds nearly absent and no flatus or stool output since acute sudden pain onset with possible bowel obstruction relating to incarcerated umbilical hernia, no h/o prior repair. Will check CT stat with lactic acid and reassess and d/w surgery, patient amenable to plan.      1208 WBC 16 which is very much nonspecific, lactic acid 2.0 in normal range reassuringly. Screening EKG normal, troponin negative and BNP normal range at 22 and sat maintained on room air. Pt in CT now.      1255 Pt reassessed and notes he feels that his abdominal pain is much improved after dilaudid, declines offered further analgesics at this point, no chest pain or SOB, sat 94% RA. Pt with prior ERCPs, LFTS slightly elevated and Tbili 3.0 so RUQ US also pending and patient with h/o choledocolithiasis requiring ERCP by Dr Esteban from [facility] most recently January 2021 noted, will await CT and RUQ US to further determine best course of action wtih ACUTE appearing pain to likely be stemming form site of umbilical hernia but recurrent choledocolithiasis also certainly possible,  "possibly even asymptomatic, thus will await imaging results and dispo per CT/US results.      1349 Pt in imaging now, signed out to afternoon MD Dr POWERS          Pertinent Labs & Imaging studies reviewed. (See chart for details)    N95 worn  A face shield was worn also  COVID PPE      At the conclusion of the encounter I discussed the results of all of the tests and the disposition. The questions were answered. The patient or family acknowledged understanding and was agreeable with the care plan.     MEDICATIONS GIVEN IN THE EMERGENCY:  Medications   HYDROmorphone (DILAUDID) injection 1 mg (1 mg Intravenous Given 7/31/21 1130)   HYDROmorphone (DILAUDID) injection 1 mg (1 mg Intravenous Given 7/31/21 1319)       NEW PRESCRIPTIONS STARTED AT TODAY'S ER VISIT  New Prescriptions    No medications on file          =================================================================    HPI      Raza Wilson is a 76 year old male with PMHx of crohn's disease and morbid obesity with hypertension who presents to the ED today via EMS with abdominal pain.     Patient reports a \"explosive\" pain in his stomach following eating this morning at 1030, citing that the pain is constant at a pain scale of 8/10. He notes that he has had trouble breathing due to the pain and cant get comfortable so his wife called the ED. Patient remarks that he has not been able to pass gas or have a bowel movements today.He endorses having a hernia in the middle of his abdomen for a while and he has never had surgery for a hernia in the past. Patient reports that the hernia has been okay lately but has become painful to palpation. Patient adds that he has seen some blood in his urine with associated pain with urination, which is abnormal for him. He reports having asthma as a kid but this has since resolved itself. Patient denies smoking, taking any medications for he pain, new cough or fever, vomiting, or any additional symptoms at this time. He " Remarks having trouble coming out of anesthesia.     REVIEW OF SYSTEMS   All other systems reviewed and are negative except as noted above in HPI.    PAST MEDICAL HISTORY:  Past Medical History:   Diagnosis Date     Abnormal involuntary movements      Achilles bursitis      BEVERLY (acute kidney injury) (H)      Crohn's colitis (H)      Edema      History of anesthesia complications     shivers and shakes after colonoscopy     Hypertension      Obesity        PAST SURGICAL HISTORY:  Past Surgical History:   Procedure Laterality Date     COLONOSCOPY       [place] ERCP DX COLLECTION SPECIMEN BRUSHING/WASHING N/A 1/15/2021    Procedure: ENDOSCOPIC RETROGRADE CHOLANGIOPANCREATOGRAPHY, BILLARY SPHINCTEROTOMY, STONE EXTRACTION;  Surgeon: Syd Esteban MD;  Location: [facility];  Service: Gastroenterology     NJ ESOPHAGOGASTRODUODENOSCOPY US SCOPE W/ADJ STRXRS N/A 1/15/2021    Procedure: ENDOSCOPIC ULTRASOUND;  Surgeon: Syd Esteban MD;  Location: [facility];  Service: Gastroenterology     TONSILLECTOMY & ADENOIDECTOMY       XR ERCP BILIARY ONLY  1/15/2021       CURRENT MEDICATIONS:    aspirin 81 MG EC tablet  cholecalciferol, vitamin D3, 50 mcg (2,000 unit) Tab  hydroCHLOROthiazide (HYDRODIURIL) 25 MG tablet  inFLIXimab (REMICADE) 100 mg injection  magnesium oxide (MAG-OX) 400 mg (241.3 mg magnesium) tablet  metoprolol succinate (TOPROL-XL) 100 MG 24 hr tablet        ALLERGIES:  Allergies   Allergen Reactions     Lisinopril Cough     Penicillins Unknown     Trimethoprim Unknown     Bactrim [Sulfamethoxazole W/Trimethoprim] Rash       FAMILY HISTORY:  History reviewed. No pertinent family history.    SOCIAL HISTORY:   Social History     Socioeconomic History     Marital status:      Spouse name: None     Number of children: None     Years of education: None     Highest education level: None   Occupational History     None   Tobacco Use     Smoking status: Never Smoker     Smokeless tobacco: Never Used    Substance and Sexual Activity     Alcohol use: Not Currently     Drug use: Never     Sexual activity: None   Other Topics Concern     None   Social History Narrative     None     Social Determinants of Health     Financial Resource Strain:      Difficulty of Paying Living Expenses:    Food Insecurity:      Worried About Running Out of Food in the Last Year:      Ran Out of Food in the Last Year:    Transportation Needs:      Lack of Transportation (Medical):      Lack of Transportation (Non-Medical):    Physical Activity:      Days of Exercise per Week:      Minutes of Exercise per Session:    Stress:      Feeling of Stress :    Social Connections:      Frequency of Communication with Friends and Family:      Frequency of Social Gatherings with Friends and Family:      Attends [employer] Services:      Active Member of Clubs or Organizations:      Attends Club or Organization Meetings:      Marital Status:    Intimate Partner Violence:      Fear of Current or Ex-Partner:      Emotionally Abused:      Physically Abused:      Sexually Abused:        VITALS:  Patient Vitals for the past 24 hrs:   BP Pulse Resp SpO2   07/31/21 1315 139/75 92 22 96 %   07/31/21 1300 134/74 92 19 96 %   07/31/21 1245 132/78 93 19 95 %   07/31/21 1230 (!) 161/95 97 20 90 %   07/31/21 1215 (!) 141/75 98 23 95 %   07/31/21 1200 139/71 97 21 94 %   07/31/21 1145 127/68 100 24 94 %   07/31/21 1130 118/84 94 -- --   07/31/21 1100 129/71 92 20 93 %   07/31/21 1050 131/80 91 22 (!) 2 %       PHYSICAL EXAM    GENERAL: Awake, alert.  Uncomfortable Appearing.   HEENT: Normocephalic, atraumatic.  Pupils equal, round and reactive.  Conjunctiva normal.  EOMI.  NECK: No stridor or apparent deformity.  PULMONARY: Symmetrical breath sounds without distress.  Lungs clear to auscultation bilaterally without wheezes, rhonchi or rales.  CARDIO: Regular rate and rhythm.  No significant murmur, rub or gallop.  Radial pulses strong and symmetrical.  ABDOMINAL:  Abdomen soft, non-distended and non-tender to palpation.  No CVAT, no palpable hepatosplenomegaly. Umbilical hernia, palpable, not reducible  EXTREMITIES: No lower extremity swelling or edema.    NEURO: Alert and oriented to person, place and time.  Cranial nerves grossly intact.  No focal motor deficit.  PSYCH: Normal mood and affect  SKIN: No rashes      LAB:  All pertinent labs reviewed and interpreted.  Results for orders placed or performed during the hospital encounter of 07/31/21   XR Chest Port 1 View    Impression    IMPRESSION: Heart size is magnified in AP projection with normal vascularity. Shallow inspiration. No focal consolidation, pneumothorax nor pleural effusion.   Comprehensive metabolic panel   Result Value Ref Range    Sodium 136 136 - 145 mmol/L    Potassium 3.7 3.5 - 5.0 mmol/L    Chloride 97 (L) 98 - 107 mmol/L    Carbon Dioxide (CO2) 25 22 - 31 mmol/L    Anion Gap 14 5 - 18 mmol/L    Urea Nitrogen 16 8 - 28 mg/dL    Creatinine 0.98 0.70 - 1.30 mg/dL    Calcium 9.4 8.5 - 10.5 mg/dL    Glucose 164 (H) 70 - 125 mg/dL    Alkaline Phosphatase 416 (H) 45 - 120 U/L     (H) 0 - 40 U/L     (H) 0 - 45 U/L    Protein Total 7.7 6.0 - 8.0 g/dL    Albumin 3.0 (L) 3.5 - 5.0 g/dL    Bilirubin Total 3.0 (H) 0.0 - 1.0 mg/dL    GFR Estimate 75 >60 mL/min/1.73m2   Result Value Ref Range    Troponin I <0.01 0.00 - 0.29 ng/mL   B-Type Natriuretic Peptide ([facility] Only)   Result Value Ref Range    BNP 22 0 - 76 pg/mL   Lactic acid whole blood   Result Value Ref Range    Lactic Acid 2.0 0.7 - 2.0 mmol/L   Result Value Ref Range    INR 1.13 0.90 - 1.15   CBC with platelets and differential   Result Value Ref Range    WBC Count 16.3 (H) 4.0 - 11.0 10e3/uL    RBC Count 4.92 4.40 - 5.90 10e6/uL    Hemoglobin 14.7 13.3 - 17.7 g/dL    Hematocrit 45.6 40.0 - 53.0 %    MCV 93 78 - 100 fL    MCH 29.9 26.5 - 33.0 pg    MCHC 32.2 31.5 - 36.5 g/dL    RDW 13.2 10.0 - 15.0 %    Platelet Count 259 150 - 450 10e3/uL     % Neutrophils 83 %    % Lymphocytes 10 %    % Monocytes 6 %    % Eosinophils 0 %    % Basophils 0 %    % Immature Granulocytes 1 %    NRBCs per 100 WBC 0 <1 /100    Absolute Neutrophils 13.5 (H) 1.6 - 8.3 10e3/uL    Absolute Lymphocytes 1.6 0.8 - 5.3 10e3/uL    Absolute Monocytes 1.0 0.0 - 1.3 10e3/uL    Absolute Eosinophils 0.1 0.0 - 0.7 10e3/uL    Absolute Basophils 0.0 0.0 - 0.2 10e3/uL    Absolute Immature Granulocytes 0.1 (H) <=0.0 10e3/uL    Absolute NRBCs 0.0 10e3/uL   SARS-COV2 (COVID-19) Virus RT-PCR    Specimen: Nasopharyngeal; Swab   Result Value Ref Range    SARS CoV2 PCR Negative Negative   Adult Type and Screen   Result Value Ref Range    ABO/RH(D) O POS     Antibody Screen Negative Negative    SPECIMEN EXPIRATION DATE [id number]    ABO and Rh   Result Value Ref Range    ABO/RH(D) O POS     SPECIMEN EXPIRATION DATE [id number]        RADIOLOGY:  Reviewed all pertinent imaging. Please see official radiology report.  XR Chest Port 1 View   Final Result   IMPRESSION: Heart size is magnified in AP projection with normal vascularity. Shallow inspiration. No focal consolidation, pneumothorax nor pleural effusion.      CT Abdomen Pelvis w Contrast    (Results Pending)   Abdomen US, limited (RUQ only)    (Results Pending)         EKG:    Reviewed and interpreted as:   Performed: 31/7/21 at 1116  Rate: 91 Bpm  Impression: Sinus rhythm with no ST abnormalities, no previous EKGs available    I have independently reviewed and interpreted the EKG(s) documented above.        I, Colt Lara, am serving as a scribe to document services personally performed by Dr. Sada Arndt based on my observation and the provider's statements to me. I, Sada Arndt MD attest that Colt Lara is acting in a scribe capacity, has observed my performance of the services and has documented them in accordance with my direction.     Sada Arndt MD  07/31/21 9798

## 2021-07-31 NOTE — ED TRIAGE NOTES
Pt has been having  pain to his abdomen since Thursday. His abdominal pain get worse this morning and couldn't breath. Last bm was 3 days ago. No N/V. C/o sob due to pain. 02 sat 92 with 2lit.

## 2021-07-31 NOTE — H&P
ADMISSION HISTORY & PHYSICAL      Jeremy Angel, Netta  ASSESSMENT AND PLAN:  76 year old male presenting with:    1.  Acute cholecystitis/cholelithiasis/possible concurrent ascending cholangitis: Abdominal CT shows acute cholecystitis with marked and diffuse wall thickening extending to the cystic duct and kostas-Jessica cystic inflammatory stranding.  There is a dilated common bile duct measuring up to 9 mm.  Surgery and GI consulted through the ED.  Due to penicillin allergy, started on Levaquin and Flagyl.  Lactic acid is normal and blood pressure acceptable, no clinical evidence of sepsis.  White count is elevated as her hepatic enzymes.    2.  Crohn's disease: On Remicade every 2 months    3.  Hypertension: Holding HCTZ and beta-blocker as n.p.o., will order as needed hydralazine and resume beta-blocker as soon as able.    4.  Hyperglycemia: Likely stress related, recheck with morning labs    5.  Incarcerated umbilical hernia seen on CT    Barriers to discharge: Need for procedures, IV antibiotics      CHIEF COMPLAINT:  Abdominal pain    HISTORY OF PRESENTING ILLNESS:  Raza Wilson is a 76 year old male who presented to the ED with epigastric pain this morning around 1030 after eating.  The pain persisted, rated as 8 out of 10.  Pain was worse with attempts at deep breathing and he felt short of breath due to inability to breathe in.  He currently feels much better in the ED after pain medication.  He denies any fevers or chills or chest pain or shortness of breath.  No known heart or lung problems    PMH/PSH:  Patient Active Problem List   Diagnosis     Acute cholecystitis     Ascending cholangitis       ALLERGIES:  Allergies   Allergen Reactions     Lisinopril Cough     Penicillins Unknown     Trimethoprim Unknown     Bactrim [Sulfamethoxazole W/Trimethoprim] Rash       MEDICATIONS:  Reviewed.  Current Facility-Administered Medications   Medication     HYDROmorphone (PF) (DILAUDID) injection 0.5 mg      levofloxacin (LEVAQUIN) infusion 750 mg     [START ON 8/1/2021] levofloxacin (LEVAQUIN) infusion 750 mg     lidocaine (LMX4) cream     lidocaine 1 % 0.1-1 mL     melatonin tablet 1 mg     [START ON 8/1/2021] metroNIDAZOLE (FLAGYL) infusion 500 mg     sodium chloride (PF) 0.9% PF flush 3 mL     sodium chloride (PF) 0.9% PF flush 3 mL     sodium chloride 0.9% infusion       SOCIAL HISTORY:  Social History     Socioeconomic History     Marital status:      Spouse name: Not on file     Number of children: Not on file     Years of education: Not on file     Highest education level: Not on file   Occupational History     Not on file   Tobacco Use     Smoking status: Never Smoker     Smokeless tobacco: Never Used   Substance and Sexual Activity     Alcohol use: Not Currently     Drug use: Never     Sexual activity: Not on file   Other Topics Concern     Not on file   Social History Narrative     Not on file     Social Determinants of Health     Financial Resource Strain:      Difficulty of Paying Living Expenses:    Food Insecurity:      Worried About Running Out of Food in the Last Year:      Ran Out of Food in the Last Year:    Transportation Needs:      Lack of Transportation (Medical):      Lack of Transportation (Non-Medical):    Physical Activity:      Days of Exercise per Week:      Minutes of Exercise per Session:    Stress:      Feeling of Stress :    Social Connections:      Frequency of Communication with Friends and Family:      Frequency of Social Gatherings with Friends and Family:      Attends [employer] Services:      Active Member of Clubs or Organizations:      Attends Club or Organization Meetings:      Marital Status:    Intimate Partner Violence:      Fear of Current or Ex-Partner:      Emotionally Abused:      Physically Abused:      Sexually Abused:        FAMILY HISTORY:  History reviewed. No pertinent family history.    ROS:  12 point ROS was performed and found to be negative except for the  pertinent positives mentioned in the HPI.      PHYSICAL EXAM:  BP (!) 153/85   Pulse 86   Temp 98.8  F (37.1  C)   Resp 12   SpO2 98%   No intake/output data recorded.  No intake/output data recorded.  CONSTITUTIONAL: No apparent distress  HEENT: Dry mouth      Sclera- anicteric      Mucous membrane- moist and pink  LUNGS: Clear to auscultation bilaterally  CARDIOVASCULAR: S1S2 regular. No murmurs, rubs or gallops,no pedal edema  GI: Soft.  Epigastric and right upper quadrant tenderness with no rebound or guarding. No organomegaly. No rigidity. Bowel sounds-rare  NEURO: Cranial nerves II-XII grossly intact. No focal neurological deficit. No involuntary movements  INTGM: No jaundice  LYMPH: no adenopathy  MSK: no joint swelling or tenderness  PSYCH: alert and oriented x 3, no agitation      DIAGNOSTIC DATA:  Recent Results (from the past 24 hour(s))   Troponin I    Collection Time: 07/31/21 11:32 AM   Result Value Ref Range    Troponin I <0.01 0.00 - 0.29 ng/mL   B-Type Natriuretic Peptide ([facility])    Collection Time: 07/31/21 11:32 AM   Result Value Ref Range    BNP 22 0 - 76 pg/mL   Lactic acid whole blood    Collection Time: 07/31/21 11:32 AM   Result Value Ref Range    Lactic Acid 2.0 0.7 - 2.0 mmol/L   INR    Collection Time: 07/31/21 11:32 AM   Result Value Ref Range    INR 1.13 0.90 - 1.15   CBC with platelets and differential    Collection Time: 07/31/21 11:32 AM   Result Value Ref Range    WBC Count 16.3 (H) 4.0 - 11.0 10e3/uL    RBC Count 4.92 4.40 - 5.90 10e6/uL    Hemoglobin 14.7 13.3 - 17.7 g/dL    Hematocrit 45.6 40.0 - 53.0 %    MCV 93 78 - 100 fL    MCH 29.9 26.5 - 33.0 pg    MCHC 32.2 31.5 - 36.5 g/dL    RDW 13.2 10.0 - 15.0 %    Platelet Count 259 150 - 450 10e3/uL    % Neutrophils 83 %    % Lymphocytes 10 %    % Monocytes 6 %    % Eosinophils 0 %    % Basophils 0 %    % Immature Granulocytes 1 %    NRBCs per 100 WBC 0 <1 /100    Absolute Neutrophils 13.5 (H) 1.6 - 8.3 10e3/uL    Absolute  Lymphocytes 1.6 0.8 - 5.3 10e3/uL    Absolute Monocytes 1.0 0.0 - 1.3 10e3/uL    Absolute Eosinophils 0.1 0.0 - 0.7 10e3/uL    Absolute Basophils 0.0 0.0 - 0.2 10e3/uL    Absolute Immature Granulocytes 0.1 (H) <=0.0 10e3/uL    Absolute NRBCs 0.0 10e3/uL   Adult Type and Screen    Collection Time: 07/31/21 11:32 AM   Result Value Ref Range    ABO/RH(D) O POS     Antibody Screen Negative Negative    SPECIMEN EXPIRATION DATE [id number]    SARS-COV2 (COVID-19) Virus RT-PCR    Collection Time: 07/31/21 11:40 AM    Specimen: Nasopharyngeal; Swab   Result Value Ref Range    SARS CoV2 PCR Negative Negative   ABO and Rh    Collection Time: 07/31/21 12:15 PM   Result Value Ref Range    ABO/RH(D) O POS     SPECIMEN EXPIRATION DATE [id number]    Comprehensive metabolic panel    Collection Time: 07/31/21 12:19 PM   Result Value Ref Range    Sodium 136 136 - 145 mmol/L    Potassium 3.7 3.5 - 5.0 mmol/L    Chloride 97 (L) 98 - 107 mmol/L    Carbon Dioxide (CO2) 25 22 - 31 mmol/L    Anion Gap 14 5 - 18 mmol/L    Urea Nitrogen 16 8 - 28 mg/dL    Creatinine 0.98 0.70 - 1.30 mg/dL    Calcium 9.4 8.5 - 10.5 mg/dL    Glucose 164 (H) 70 - 125 mg/dL    Alkaline Phosphatase 416 (H) 45 - 120 U/L     (H) 0 - 40 U/L     (H) 0 - 45 U/L    Protein Total 7.7 6.0 - 8.0 g/dL    Albumin 3.0 (L) 3.5 - 5.0 g/dL    Bilirubin Total 3.0 (H) 0.0 - 1.0 mg/dL    GFR Estimate 75 >60 mL/min/1.73m2     All lab studies reviewed personally    Abdomen US, limited (RUQ only)    Result Date: 7/31/2021  EXAM: US ABDOMEN LIMITED LOCATION: [facility] DATE/TIME: 7/31/2021 1:54 PM INDICATION: Transaminitis with Tbili 3.0 COMPARISON: Same day abdomen and pelvis CT TECHNIQUE: Limited abdominal ultrasound. FINDINGS: GALLBLADDER: Layering biliary sludge sludge and/or small stones in the gallbladder lumen. Diffuse gallbladder wall thickening measuring 6 mm. There is pericholecystic inflammation. BILE DUCTS: Common bile duct is  dilated but no intrahepatic ductal dilation. The common duct measures 10 mm. Distal duct was obscured by bowel gas. LIVER: Normal parenchyma with smooth contour. No focal mass. RIGHT KIDNEY: No hydronephrosis. PANCREAS: The pancreas is largely obscured by overlying gas. No ascites.     IMPRESSION: 1.  Cholelithiasis/biliary sludge and multiple findings consistent with acute cholecystitis. 2.  Dilated common bile duct measuring up to 10 mm. Distal common bile duct is obscured. No choledocholithiasis within the imaged CBD. Possible concurrent ascending cholangitis.    XR Chest Port 1 View    Result Date: 7/31/2021  EXAM: XR CHEST PORT 1 VIEW LOCATION: [facility] DATE/TIME: 7/31/2021 11:42 AM INDICATION: preop COMPARISON: 3/30/2016     IMPRESSION: Heart size is magnified in AP projection with normal vascularity. Shallow inspiration. No focal consolidation, pneumothorax nor pleural effusion.    CT Abdomen Pelvis w Contrast    Result Date: 7/31/2021  EXAM: CT ABDOMEN PELVIS W CONTRAST LOCATION: [facility] DATE/TIME: 7/31/2021 1:31 PM INDICATION: Abdominal pain at umbilical hernia COMPARISON: CT of the abdomen and pelvis without contrast 12/13/2016 TECHNIQUE: CT scan of the abdomen and pelvis was performed following injection of IV contrast. Multiplanar reformats were obtained. Dose reduction techniques were used. CONTRAST: Isovue 370    100ml FINDINGS: LOWER CHEST: Symmetric bands of subpleural opacity in the dependent lower lobes consistent with positional atelectasis. Gynecomastia. HEPATOBILIARY: Diffuse gallbladder wall thickening and inflammatory stranding in the pericholecystic fat. Gallbladder mucosal enhancement which extends to the cystic duct. There are no calcified gallstones. Common bile duct is mildly enlarged measuring 9  mm. No choledocholithiasis. Diffuse fatty infiltration of liver. No liver lesions. PANCREAS: Normal. SPLEEN: Normal. ADRENAL GLANDS: Stable  hypoattenuating 21 x 27 mm left adrenal nodule consistent with an adrenal adenoma. The right adrenal gland is normal. KIDNEYS/BLADDER: Kidneys are normal in size with symmetric enhancement. No nephrolithiasis or hydronephrosis. Urinary bladder is decompressed. BOWEL: Small amount of fluid and air in the stomach without distention. Normal caliber small bowel. Colon is largely decompressed. No colonic wall thickening. Distal descending and sigmoid diverticula are present. No pericolonic inflammatory stranding. LYMPH NODES: Normal. VASCULATURE: Mild aortoiliac atheromatous calcifications. No aneurysm or critical stenosis. PELVIC ORGANS: Prostate gland is normal in size. Seminal vesicles are symmetric. No pelvic free fluid. MUSCULOSKELETAL: Normal there is a fat-containing umbilical hernia. Large patient habitus. No associated edema/inflammation. Fairly diffuse mild to moderate lower thoracic and lumbar degenerative osteophytes and disc space narrowing. No aggressive or destructive bone lesions are present.     IMPRESSION: 1.  Acute cholecystitis with marked and diffuse wall thickening, mucosal hyperenhancement extending to the cystic duct and pericholecystic inflammatory stranding. No calcified gallstones. 2.  Dilated common bile duct measuring up to 9 mm. No common duct stones are detected. 3.  Fat-containing umbilical hernia without associated inflammation. 4.  Distal descending and sigmoid diverticulosis but no diverticulitis.    Radiology report reviewed.        Moshe San MD  [facility] Medicine Service

## 2021-07-31 NOTE — ED NOTES
Bed: JNED-11  Expected date: 7/31/21  Expected time: 10:18 AM  Means of arrival: Ambulance  Comments:  75yo SOB

## 2021-07-31 NOTE — PHARMACY-ADMISSION MEDICATION HISTORY
Pharmacy Note - Admission Medication History    Pertinent Provider Information: none     ______________________________________________________________________    Prior To Admission (PTA) med list completed and updated in EMR.       PTA Med List   Medication Sig Last Dose     acetaminophen (TYLENOL) 500 MG tablet Take 1,000-1,500 mg by mouth every 6 hours as needed for mild pain Past Week at Unknown time     aspirin 81 MG EC tablet [ASPIRIN 81 MG EC TABLET] Take 81 mg by mouth daily. Past Week at Unknown time     cholecalciferol, vitamin D3, 50 mcg (2,000 unit) Tab [CHOLECALCIFEROL, VITAMIN D3, 50 MCG (2,000 UNIT) TAB] Take 2 tablets by mouth daily. Past Week at Unknown time     inFLIXimab (REMICADE) 100 mg injection [INFLIXIMAB (REMICADE) 100 MG INJECTION] 7.5 mg/kg every 2 (two) months. Per GI unknown     magnesium oxide (MAG-OX) 400 mg (241.3 mg magnesium) tablet [MAGNESIUM OXIDE (MAG-OX) 400 MG (241.3 MG MAGNESIUM) TABLET] Take 400 mg by mouth daily. Past Week at Unknown time     metoprolol succinate (TOPROL-XL) 100 MG 24 hr tablet [METOPROLOL SUCCINATE (TOPROL-XL) 100 MG 24 HR TABLET] Take 100 mg by mouth daily. Past Week at Unknown time     triamterene-HCTZ (DYAZIDE) 37.5-25 MG capsule Take 1 capsule by mouth daily Past Week at Unknown time       Information source(s): Patient, Family member and CareEverywhere/[facility]  Method of interview communication: in-person    Summary of Changes to PTA Med List  New: Triamterene/HCTZ, acetaminophen  Discontinued: HCTZ  Changed: none    Patient was asked about OTC/herbal products specifically.  PTA med list reflects this.    In the past week, patient estimated taking medication this percent of the time:  50-90% due to illness.    Allergies were reviewed, assessed, and updated with the patient.      Patient does not use any multi-dose medications prior to admission.    The information provided in this note is only as accurate as the sources available at the time of the  update(s).    Thank you for the opportunity to participate in the care of this patient.    Odessa Laird  7/31/2021 3:14 PM

## 2021-08-01 ENCOUNTER — ANESTHESIA EVENT (OUTPATIENT)
Dept: SURGERY | Facility: HOSPITAL | Age: 76
DRG: 418 | End: 2021-08-01
Payer: MEDICARE

## 2021-08-01 ENCOUNTER — ANESTHESIA (OUTPATIENT)
Dept: SURGERY | Facility: HOSPITAL | Age: 76
DRG: 418 | End: 2021-08-01
Payer: MEDICARE

## 2021-08-01 ENCOUNTER — APPOINTMENT (OUTPATIENT)
Dept: RADIOLOGY | Facility: HOSPITAL | Age: 76
DRG: 418 | End: 2021-08-01
Attending: INTERNAL MEDICINE
Payer: MEDICARE

## 2021-08-01 PROBLEM — K42.9 UMBILICAL HERNIA WITHOUT OBSTRUCTION AND WITHOUT GANGRENE: Status: ACTIVE | Noted: 2021-08-01

## 2021-08-01 PROBLEM — D64.9 NORMOCYTIC ANEMIA: Status: ACTIVE | Noted: 2021-08-01

## 2021-08-01 PROBLEM — K80.50 CHOLEDOCHOLITHIASIS: Status: ACTIVE | Noted: 2021-08-01

## 2021-08-01 PROBLEM — K76.89 LIVER DYSFUNCTION: Status: ACTIVE | Noted: 2021-08-01

## 2021-08-01 PROBLEM — I10 BENIGN ESSENTIAL HYPERTENSION: Status: ACTIVE | Noted: 2021-08-01

## 2021-08-01 PROBLEM — K50.90 CROHN'S DISEASE (H): Status: ACTIVE | Noted: 2021-08-01

## 2021-08-01 LAB
ALBUMIN SERPL-MCNC: 2.5 G/DL (ref 3.5–5)
ALBUMIN UR-MCNC: 50 MG/DL
ALP SERPL-CCNC: 386 U/L (ref 45–120)
ALT SERPL W P-5'-P-CCNC: 262 U/L (ref 0–45)
ANION GAP SERPL CALCULATED.3IONS-SCNC: 6 MMOL/L (ref 5–18)
APPEARANCE UR: CLEAR
AST SERPL W P-5'-P-CCNC: 288 U/L (ref 0–40)
BILIRUB SERPL-MCNC: 3.4 MG/DL (ref 0–1)
BILIRUB UR QL STRIP: ABNORMAL
BUN SERPL-MCNC: 18 MG/DL (ref 8–28)
CALCIUM SERPL-MCNC: 8.9 MG/DL (ref 8.5–10.5)
CHLORIDE BLD-SCNC: 101 MMOL/L (ref 98–107)
CO2 SERPL-SCNC: 31 MMOL/L (ref 22–31)
COLOR UR AUTO: ABNORMAL
CREAT SERPL-MCNC: 0.8 MG/DL (ref 0.7–1.3)
ERCP: NORMAL
ERYTHROCYTE [DISTWIDTH] IN BLOOD BY AUTOMATED COUNT: 13 % (ref 10–15)
GFR SERPL CREATININE-BSD FRML MDRD: 87 ML/MIN/1.73M2
GLUCOSE BLD-MCNC: 109 MG/DL (ref 70–125)
GLUCOSE UR STRIP-MCNC: NEGATIVE MG/DL
HCT VFR BLD AUTO: 39.8 % (ref 40–53)
HGB BLD-MCNC: 12.8 G/DL (ref 13.3–17.7)
HGB BLD-MCNC: 12.8 G/DL (ref 13.3–17.7)
HGB UR QL STRIP: ABNORMAL
HYALINE CASTS: 3 /LPF
KETONES UR STRIP-MCNC: 10 MG/DL
LEUKOCYTE ESTERASE UR QL STRIP: NEGATIVE
MCH RBC QN AUTO: 30.1 PG (ref 26.5–33)
MCHC RBC AUTO-ENTMCNC: 32.2 G/DL (ref 31.5–36.5)
MCV RBC AUTO: 94 FL (ref 78–100)
MUCOUS THREADS #/AREA URNS LPF: PRESENT /LPF
NITRATE UR QL: NEGATIVE
PH UR STRIP: 6 [PH] (ref 5–7)
PLATELET # BLD AUTO: 188 10E3/UL (ref 150–450)
PLATELET # BLD AUTO: 214 10E3/UL (ref 150–450)
POTASSIUM BLD-SCNC: 4.6 MMOL/L (ref 3.5–5)
PROT SERPL-MCNC: 6.5 G/DL (ref 6–8)
RBC # BLD AUTO: 4.25 10E6/UL (ref 4.4–5.9)
RBC URINE: 12 /HPF
SODIUM SERPL-SCNC: 138 MMOL/L (ref 136–145)
SP GR UR STRIP: >1.05 (ref 1–1.03)
UROBILINOGEN UR STRIP-MCNC: 8 MG/DL
WBC # BLD AUTO: 8.4 10E3/UL (ref 4–11)
WBC URINE: 1 /HPF

## 2021-08-01 PROCEDURE — 120N000001 HC R&B MED SURG/OB

## 2021-08-01 PROCEDURE — 88304 TISSUE EXAM BY PATHOLOGIST: CPT | Mod: TC | Performed by: SURGERY

## 2021-08-01 PROCEDURE — 250N000009 HC RX 250: Performed by: NURSE ANESTHETIST, CERTIFIED REGISTERED

## 2021-08-01 PROCEDURE — C1726 CATH, BAL DIL, NON-VASCULAR: HCPCS | Performed by: SURGERY

## 2021-08-01 PROCEDURE — 360N000076 HC SURGERY LEVEL 3, PER MIN: Performed by: SURGERY

## 2021-08-01 PROCEDURE — 250N000011 HC RX IP 250 OP 636: Performed by: SURGERY

## 2021-08-01 PROCEDURE — 370N000017 HC ANESTHESIA TECHNICAL FEE, PER MIN: Performed by: SURGERY

## 2021-08-01 PROCEDURE — 999N000141 HC STATISTIC PRE-PROCEDURE NURSING ASSESSMENT: Performed by: SURGERY

## 2021-08-01 PROCEDURE — 85018 HEMOGLOBIN: CPT | Performed by: FAMILY MEDICINE

## 2021-08-01 PROCEDURE — 258N000003 HC RX IP 258 OP 636: Performed by: EMERGENCY MEDICINE

## 2021-08-01 PROCEDURE — C1769 GUIDE WIRE: HCPCS | Performed by: SURGERY

## 2021-08-01 PROCEDURE — 250N000013 HC RX MED GY IP 250 OP 250 PS 637: Performed by: FAMILY MEDICINE

## 2021-08-01 PROCEDURE — 36415 COLL VENOUS BLD VENIPUNCTURE: CPT | Performed by: FAMILY MEDICINE

## 2021-08-01 PROCEDURE — 258N000003 HC RX IP 258 OP 636: Performed by: ANESTHESIOLOGY

## 2021-08-01 PROCEDURE — 250N000011 HC RX IP 250 OP 636: Performed by: ANESTHESIOLOGY

## 2021-08-01 PROCEDURE — 81001 URINALYSIS AUTO W/SCOPE: CPT | Performed by: FAMILY MEDICINE

## 2021-08-01 PROCEDURE — 85027 COMPLETE CBC AUTOMATED: CPT | Performed by: EMERGENCY MEDICINE

## 2021-08-01 PROCEDURE — P9041 ALBUMIN (HUMAN),5%, 50ML: HCPCS | Performed by: NURSE ANESTHETIST, CERTIFIED REGISTERED

## 2021-08-01 PROCEDURE — 710N000010 HC RECOVERY PHASE 1, LEVEL 2, PER MIN: Performed by: SURGERY

## 2021-08-01 PROCEDURE — 0FT44ZZ RESECTION OF GALLBLADDER, PERCUTANEOUS ENDOSCOPIC APPROACH: ICD-10-PCS | Performed by: SURGERY

## 2021-08-01 PROCEDURE — 272N000001 HC OR GENERAL SUPPLY STERILE: Performed by: SURGERY

## 2021-08-01 PROCEDURE — 36415 COLL VENOUS BLD VENIPUNCTURE: CPT | Performed by: EMERGENCY MEDICINE

## 2021-08-01 PROCEDURE — 250N000011 HC RX IP 250 OP 636: Performed by: EMERGENCY MEDICINE

## 2021-08-01 PROCEDURE — 250N000011 HC RX IP 250 OP 636: Performed by: NURSE ANESTHETIST, CERTIFIED REGISTERED

## 2021-08-01 PROCEDURE — 85049 AUTOMATED PLATELET COUNT: CPT | Performed by: SURGERY

## 2021-08-01 PROCEDURE — 82040 ASSAY OF SERUM ALBUMIN: CPT | Performed by: EMERGENCY MEDICINE

## 2021-08-01 PROCEDURE — 250N000025 HC SEVOFLURANE, PER MIN: Performed by: SURGERY

## 2021-08-01 PROCEDURE — 74330 X-RAY BILE/PANC ENDOSCOPY: CPT

## 2021-08-01 PROCEDURE — 258N000003 HC RX IP 258 OP 636: Performed by: SURGERY

## 2021-08-01 PROCEDURE — 99232 SBSQ HOSP IP/OBS MODERATE 35: CPT | Performed by: FAMILY MEDICINE

## 2021-08-01 PROCEDURE — 47562 LAPAROSCOPIC CHOLECYSTECTOMY: CPT | Mod: 22 | Performed by: SURGERY

## 2021-08-01 PROCEDURE — 258N000003 HC RX IP 258 OP 636: Performed by: NURSE ANESTHETIST, CERTIFIED REGISTERED

## 2021-08-01 PROCEDURE — 0FC98ZZ EXTIRPATION OF MATTER FROM COMMON BILE DUCT, VIA NATURAL OR ARTIFICIAL OPENING ENDOSCOPIC: ICD-10-PCS | Performed by: INTERNAL MEDICINE

## 2021-08-01 RX ORDER — OXYCODONE HYDROCHLORIDE 5 MG/1
5 TABLET ORAL EVERY 4 HOURS PRN
Status: DISCONTINUED | OUTPATIENT
Start: 2021-08-01 | End: 2021-08-06 | Stop reason: HOSPADM

## 2021-08-01 RX ORDER — FENTANYL CITRATE 50 UG/ML
25 INJECTION, SOLUTION INTRAMUSCULAR; INTRAVENOUS EVERY 5 MIN PRN
Status: DISCONTINUED | OUTPATIENT
Start: 2021-08-01 | End: 2021-08-01 | Stop reason: HOSPADM

## 2021-08-01 RX ORDER — MEPERIDINE HYDROCHLORIDE 25 MG/ML
12.5 INJECTION INTRAMUSCULAR; INTRAVENOUS; SUBCUTANEOUS EVERY 5 MIN PRN
Status: CANCELLED | OUTPATIENT
Start: 2021-08-01

## 2021-08-01 RX ORDER — TRIAMTERENE/HYDROCHLOROTHIAZID 37.5-25 MG
1 TABLET ORAL DAILY
Status: DISCONTINUED | OUTPATIENT
Start: 2021-08-01 | End: 2021-08-06 | Stop reason: HOSPADM

## 2021-08-01 RX ORDER — DEXAMETHASONE SODIUM PHOSPHATE 10 MG/ML
INJECTION, SOLUTION INTRAMUSCULAR; INTRAVENOUS PRN
Status: DISCONTINUED | OUTPATIENT
Start: 2021-08-01 | End: 2021-08-01

## 2021-08-01 RX ORDER — HALOPERIDOL 5 MG/ML
1 INJECTION INTRAMUSCULAR
Status: CANCELLED | OUTPATIENT
Start: 2021-08-01

## 2021-08-01 RX ORDER — OXYCODONE HYDROCHLORIDE 5 MG/1
5 TABLET ORAL EVERY 4 HOURS PRN
Status: DISCONTINUED | OUTPATIENT
Start: 2021-08-01 | End: 2021-08-01

## 2021-08-01 RX ORDER — CLINDAMYCIN PHOSPHATE 900 MG/50ML
900 INJECTION, SOLUTION INTRAVENOUS SEE ADMIN INSTRUCTIONS
Status: DISCONTINUED | OUTPATIENT
Start: 2021-08-01 | End: 2021-08-01 | Stop reason: HOSPADM

## 2021-08-01 RX ORDER — ONDANSETRON 2 MG/ML
4 INJECTION INTRAMUSCULAR; INTRAVENOUS EVERY 30 MIN PRN
Status: DISCONTINUED | OUTPATIENT
Start: 2021-08-01 | End: 2021-08-01 | Stop reason: HOSPADM

## 2021-08-01 RX ORDER — SODIUM CHLORIDE, SODIUM LACTATE, POTASSIUM CHLORIDE, CALCIUM CHLORIDE 600; 310; 30; 20 MG/100ML; MG/100ML; MG/100ML; MG/100ML
INJECTION, SOLUTION INTRAVENOUS CONTINUOUS
Status: DISCONTINUED | OUTPATIENT
Start: 2021-08-01 | End: 2021-08-01 | Stop reason: HOSPADM

## 2021-08-01 RX ORDER — ALBUMIN, HUMAN INJ 5% 5 %
SOLUTION INTRAVENOUS CONTINUOUS PRN
Status: DISCONTINUED | OUTPATIENT
Start: 2021-08-01 | End: 2021-08-01

## 2021-08-01 RX ORDER — BUPIVACAINE HYDROCHLORIDE 2.5 MG/ML
INJECTION, SOLUTION INFILTRATION; PERINEURAL PRN
Status: DISCONTINUED | OUTPATIENT
Start: 2021-08-01 | End: 2021-08-01 | Stop reason: HOSPADM

## 2021-08-01 RX ORDER — FENTANYL CITRATE 50 UG/ML
INJECTION, SOLUTION INTRAMUSCULAR; INTRAVENOUS PRN
Status: DISCONTINUED | OUTPATIENT
Start: 2021-08-01 | End: 2021-08-01

## 2021-08-01 RX ORDER — DIPHENHYDRAMINE HCL 12.5 MG/5ML
12.5 SOLUTION ORAL EVERY 6 HOURS PRN
Status: CANCELLED | OUTPATIENT
Start: 2021-08-01

## 2021-08-01 RX ORDER — SODIUM CHLORIDE, SODIUM LACTATE, POTASSIUM CHLORIDE, CALCIUM CHLORIDE 600; 310; 30; 20 MG/100ML; MG/100ML; MG/100ML; MG/100ML
INJECTION, SOLUTION INTRAVENOUS CONTINUOUS
Status: CANCELLED | OUTPATIENT
Start: 2021-08-01

## 2021-08-01 RX ORDER — HYDROCODONE BITARTRATE AND ACETAMINOPHEN 5; 325 MG/1; MG/1
1-2 TABLET ORAL EVERY 4 HOURS PRN
Status: DISCONTINUED | OUTPATIENT
Start: 2021-08-01 | End: 2021-08-06 | Stop reason: HOSPADM

## 2021-08-01 RX ORDER — PROPOFOL 10 MG/ML
INJECTION, EMULSION INTRAVENOUS PRN
Status: DISCONTINUED | OUTPATIENT
Start: 2021-08-01 | End: 2021-08-01

## 2021-08-01 RX ORDER — LIDOCAINE 40 MG/G
CREAM TOPICAL
Status: DISCONTINUED | OUTPATIENT
Start: 2021-08-01 | End: 2021-08-01 | Stop reason: HOSPADM

## 2021-08-01 RX ORDER — DIPHENHYDRAMINE HYDROCHLORIDE 50 MG/ML
12.5 INJECTION INTRAMUSCULAR; INTRAVENOUS EVERY 6 HOURS PRN
Status: DISCONTINUED | OUTPATIENT
Start: 2021-08-01 | End: 2021-08-01 | Stop reason: HOSPADM

## 2021-08-01 RX ORDER — HALOPERIDOL 5 MG/ML
1 INJECTION INTRAMUSCULAR
Status: DISCONTINUED | OUTPATIENT
Start: 2021-08-01 | End: 2021-08-01 | Stop reason: HOSPADM

## 2021-08-01 RX ORDER — SODIUM CHLORIDE, SODIUM LACTATE, POTASSIUM CHLORIDE, CALCIUM CHLORIDE 600; 310; 30; 20 MG/100ML; MG/100ML; MG/100ML; MG/100ML
INJECTION, SOLUTION INTRAVENOUS CONTINUOUS
Status: DISCONTINUED | OUTPATIENT
Start: 2021-08-01 | End: 2021-08-01

## 2021-08-01 RX ORDER — DIPHENHYDRAMINE HCL 12.5 MG/5ML
12.5 SOLUTION ORAL EVERY 6 HOURS PRN
Status: DISCONTINUED | OUTPATIENT
Start: 2021-08-01 | End: 2021-08-01 | Stop reason: HOSPADM

## 2021-08-01 RX ORDER — MEPERIDINE HYDROCHLORIDE 25 MG/ML
12.5 INJECTION INTRAMUSCULAR; INTRAVENOUS; SUBCUTANEOUS EVERY 5 MIN PRN
Status: DISCONTINUED | OUTPATIENT
Start: 2021-08-01 | End: 2021-08-01 | Stop reason: HOSPADM

## 2021-08-01 RX ORDER — MAGNESIUM SULFATE 4 G/50ML
4 INJECTION INTRAVENOUS ONCE
Status: COMPLETED | OUTPATIENT
Start: 2021-08-01 | End: 2021-08-01

## 2021-08-01 RX ORDER — TRIAMTERENE AND HYDROCHLOROTHIAZIDE 37.5; 25 MG/1; MG/1
1 CAPSULE ORAL DAILY
Status: DISCONTINUED | OUTPATIENT
Start: 2021-08-01 | End: 2021-08-01

## 2021-08-01 RX ORDER — LIDOCAINE HYDROCHLORIDE 20 MG/ML
INJECTION, SOLUTION INFILTRATION; PERINEURAL PRN
Status: DISCONTINUED | OUTPATIENT
Start: 2021-08-01 | End: 2021-08-01

## 2021-08-01 RX ORDER — KETAMINE HYDROCHLORIDE 10 MG/ML
INJECTION INTRAMUSCULAR; INTRAVENOUS PRN
Status: DISCONTINUED | OUTPATIENT
Start: 2021-08-01 | End: 2021-08-01

## 2021-08-01 RX ORDER — FENTANYL CITRATE 50 UG/ML
25 INJECTION, SOLUTION INTRAMUSCULAR; INTRAVENOUS EVERY 5 MIN PRN
Status: CANCELLED | OUTPATIENT
Start: 2021-08-01 | End: 2021-08-01

## 2021-08-01 RX ORDER — KETOROLAC TROMETHAMINE 15 MG/ML
15 INJECTION, SOLUTION INTRAMUSCULAR; INTRAVENOUS EVERY 6 HOURS PRN
Status: DISCONTINUED | OUTPATIENT
Start: 2021-08-01 | End: 2021-08-06 | Stop reason: HOSPADM

## 2021-08-01 RX ORDER — HYDRALAZINE HYDROCHLORIDE 20 MG/ML
INJECTION INTRAMUSCULAR; INTRAVENOUS PRN
Status: DISCONTINUED | OUTPATIENT
Start: 2021-08-01 | End: 2021-08-01

## 2021-08-01 RX ORDER — CLINDAMYCIN PHOSPHATE 900 MG/50ML
900 INJECTION, SOLUTION INTRAVENOUS
Status: COMPLETED | OUTPATIENT
Start: 2021-08-01 | End: 2021-08-01

## 2021-08-01 RX ORDER — ONDANSETRON 2 MG/ML
INJECTION INTRAMUSCULAR; INTRAVENOUS PRN
Status: DISCONTINUED | OUTPATIENT
Start: 2021-08-01 | End: 2021-08-01

## 2021-08-01 RX ORDER — OXYCODONE HYDROCHLORIDE 5 MG/1
5 TABLET ORAL EVERY 4 HOURS PRN
Status: CANCELLED | OUTPATIENT
Start: 2021-08-01

## 2021-08-01 RX ORDER — ONDANSETRON 2 MG/ML
4 INJECTION INTRAMUSCULAR; INTRAVENOUS EVERY 30 MIN PRN
Status: CANCELLED | OUTPATIENT
Start: 2021-08-01

## 2021-08-01 RX ORDER — DIPHENHYDRAMINE HYDROCHLORIDE 50 MG/ML
12.5 INJECTION INTRAMUSCULAR; INTRAVENOUS EVERY 6 HOURS PRN
Status: CANCELLED | OUTPATIENT
Start: 2021-08-01

## 2021-08-01 RX ORDER — HEPARIN SODIUM 5000 [USP'U]/.5ML
5000 INJECTION, SOLUTION INTRAVENOUS; SUBCUTANEOUS EVERY 8 HOURS
Status: DISCONTINUED | OUTPATIENT
Start: 2021-08-02 | End: 2021-08-06 | Stop reason: HOSPADM

## 2021-08-01 RX ORDER — METOPROLOL SUCCINATE 25 MG/1
100 TABLET, EXTENDED RELEASE ORAL DAILY
Status: DISCONTINUED | OUTPATIENT
Start: 2021-08-01 | End: 2021-08-06 | Stop reason: HOSPADM

## 2021-08-01 RX ORDER — ONDANSETRON 4 MG/1
4 TABLET, ORALLY DISINTEGRATING ORAL EVERY 30 MIN PRN
Status: CANCELLED | OUTPATIENT
Start: 2021-08-01

## 2021-08-01 RX ORDER — ONDANSETRON 4 MG/1
4 TABLET, ORALLY DISINTEGRATING ORAL EVERY 30 MIN PRN
Status: DISCONTINUED | OUTPATIENT
Start: 2021-08-01 | End: 2021-08-01 | Stop reason: HOSPADM

## 2021-08-01 RX ADMIN — TRIAMTERENE AND HYDROCHLOROTHIAZIDE 1 TABLET: 37.5; 25 TABLET ORAL at 21:54

## 2021-08-01 RX ADMIN — FENTANYL CITRATE 25 MCG: 50 INJECTION, SOLUTION INTRAMUSCULAR; INTRAVENOUS at 14:35

## 2021-08-01 RX ADMIN — FENTANYL CITRATE 50 MCG: 50 INJECTION, SOLUTION INTRAMUSCULAR; INTRAVENOUS at 11:39

## 2021-08-01 RX ADMIN — SODIUM CHLORIDE, PRESERVATIVE FREE: 5 INJECTION INTRAVENOUS at 04:48

## 2021-08-01 RX ADMIN — LEVOFLOXACIN 750 MG: 5 INJECTION, SOLUTION INTRAVENOUS at 17:51

## 2021-08-01 RX ADMIN — METOPROLOL SUCCINATE 100 MG: 25 TABLET, EXTENDED RELEASE ORAL at 21:55

## 2021-08-01 RX ADMIN — SUGAMMADEX 400 MG: 100 INJECTION, SOLUTION INTRAVENOUS at 12:49

## 2021-08-01 RX ADMIN — FENTANYL CITRATE 25 MCG: 50 INJECTION, SOLUTION INTRAMUSCULAR; INTRAVENOUS at 13:56

## 2021-08-01 RX ADMIN — HYDROMORPHONE HYDROCHLORIDE 0.5 MG: 1 INJECTION, SOLUTION INTRAMUSCULAR; INTRAVENOUS; SUBCUTANEOUS at 13:45

## 2021-08-01 RX ADMIN — LIDOCAINE HYDROCHLORIDE 60 MG: 20 INJECTION, SOLUTION INFILTRATION; PERINEURAL at 08:07

## 2021-08-01 RX ADMIN — CLINDAMYCIN PHOSPHATE 900 MG: 900 INJECTION, SOLUTION INTRAVENOUS at 08:21

## 2021-08-01 RX ADMIN — PHENYLEPHRINE HYDROCHLORIDE 25 MCG: 10 INJECTION INTRAVENOUS at 09:40

## 2021-08-01 RX ADMIN — KETAMINE HYDROCHLORIDE 50 MG: 10 INJECTION, SOLUTION INTRAMUSCULAR; INTRAVENOUS at 08:07

## 2021-08-01 RX ADMIN — SODIUM CHLORIDE, POTASSIUM CHLORIDE, SODIUM LACTATE AND CALCIUM CHLORIDE: 600; 310; 30; 20 INJECTION, SOLUTION INTRAVENOUS at 07:56

## 2021-08-01 RX ADMIN — ALBUMIN HUMAN: 0.05 INJECTION, SOLUTION INTRAVENOUS at 09:28

## 2021-08-01 RX ADMIN — SODIUM CHLORIDE, POTASSIUM CHLORIDE, SODIUM LACTATE AND CALCIUM CHLORIDE: 600; 310; 30; 20 INJECTION, SOLUTION INTRAVENOUS at 15:28

## 2021-08-01 RX ADMIN — ALBUMIN HUMAN: 0.05 INJECTION, SOLUTION INTRAVENOUS at 09:22

## 2021-08-01 RX ADMIN — SODIUM CHLORIDE, POTASSIUM CHLORIDE, SODIUM LACTATE AND CALCIUM CHLORIDE: 600; 310; 30; 20 INJECTION, SOLUTION INTRAVENOUS at 09:22

## 2021-08-01 RX ADMIN — FENTANYL CITRATE 25 MCG: 50 INJECTION, SOLUTION INTRAMUSCULAR; INTRAVENOUS at 14:30

## 2021-08-01 RX ADMIN — SODIUM CHLORIDE, PRESERVATIVE FREE: 5 INJECTION INTRAVENOUS at 22:47

## 2021-08-01 RX ADMIN — FENTANYL CITRATE 100 MCG: 50 INJECTION, SOLUTION INTRAMUSCULAR; INTRAVENOUS at 08:07

## 2021-08-01 RX ADMIN — FENTANYL CITRATE 100 MCG: 50 INJECTION, SOLUTION INTRAMUSCULAR; INTRAVENOUS at 09:27

## 2021-08-01 RX ADMIN — METRONIDAZOLE 500 MG: 500 INJECTION, SOLUTION INTRAVENOUS at 16:40

## 2021-08-01 RX ADMIN — ROCURONIUM BROMIDE 20 MG: 10 INJECTION, SOLUTION INTRAVENOUS at 11:45

## 2021-08-01 RX ADMIN — DEXAMETHASONE SODIUM PHOSPHATE 10 MG: 10 INJECTION, SOLUTION INTRAMUSCULAR; INTRAVENOUS at 08:07

## 2021-08-01 RX ADMIN — ROCURONIUM BROMIDE 50 MG: 10 INJECTION, SOLUTION INTRAVENOUS at 08:07

## 2021-08-01 RX ADMIN — HYDROMORPHONE HYDROCHLORIDE 1 MG: 1 INJECTION, SOLUTION INTRAMUSCULAR; INTRAVENOUS; SUBCUTANEOUS at 16:36

## 2021-08-01 RX ADMIN — MAGNESIUM SULFATE HEPTAHYDRATE 4 G: 80 INJECTION, SOLUTION INTRAVENOUS at 10:09

## 2021-08-01 RX ADMIN — HYDROMORPHONE HYDROCHLORIDE 1 MG: 1 INJECTION, SOLUTION INTRAMUSCULAR; INTRAVENOUS; SUBCUTANEOUS at 18:55

## 2021-08-01 RX ADMIN — ONDANSETRON 4 MG: 2 INJECTION INTRAMUSCULAR; INTRAVENOUS at 08:28

## 2021-08-01 RX ADMIN — HYDRALAZINE HYDROCHLORIDE 10 MG: 20 INJECTION INTRAMUSCULAR; INTRAVENOUS at 13:00

## 2021-08-01 RX ADMIN — METRONIDAZOLE 500 MG: 500 INJECTION, SOLUTION INTRAVENOUS at 03:45

## 2021-08-01 RX ADMIN — FENTANYL CITRATE 50 MCG: 50 INJECTION, SOLUTION INTRAMUSCULAR; INTRAVENOUS at 12:17

## 2021-08-01 RX ADMIN — PROPOFOL 200 MG: 10 INJECTION, EMULSION INTRAVENOUS at 08:07

## 2021-08-01 RX ADMIN — ROCURONIUM BROMIDE 10 MG: 10 INJECTION, SOLUTION INTRAVENOUS at 09:10

## 2021-08-01 RX ADMIN — FENTANYL CITRATE 25 MCG: 50 INJECTION, SOLUTION INTRAMUSCULAR; INTRAVENOUS at 14:01

## 2021-08-01 NOTE — PRE-PROCEDURE
GENERAL PRE-PROCEDURE:   Procedure:  Endoscopic retrograde cholangio pancreatography  Date/Time:  8/1/2021 7:38 AM    Verbal consent obtained?: Yes    Written consent obtained?: Yes    Risks and benefits: Risks, benefits and alternatives were discussed    Consent given by:  Patient  Patient states understanding of procedure being performed: Yes    Patient's understanding of procedure matches consent: Yes    Procedure consent matches procedure scheduled: Yes    Expected level of sedation:  Moderate  Appropriately NPO:  Yes  Mallampati  :  Grade 2- soft palate, base of uvula, tonsillar pillars, and portion of posterior pharyngeal wall visible  Lungs:  Lungs clear with good breath sounds bilaterally  Heart:  Normal heart sounds and rate  History & Physical reviewed:  History and physical reviewed and no updates needed  Statement of review:  I have reviewed the lab findings, diagnostic data, medications, and the plan for sedation

## 2021-08-01 NOTE — PROGRESS NOTES
Assessment & Plan   76-year-old male with Crohn's disease, HTN presents with acute cholecystitis and choledocholithiasis.    Active Problems:    Acute cholecystitis    Choledocholithiasis    Umbilical hernia without obstruction and without gangrene    Liver dysfunction    Crohn's disease (H)    Benign essential hypertension    Normocytic anemia       Acute cholecystitis and choledocholithiasis  -Ultrasound abdomen with common bile duct dilation to 10 mm, gallbladder sludge and small stones as well as pericholecystic inflammation.  CT abdomen pelvis also with signs of acute cholecystitis.  Patient with elevated ALT, AST, bilirubin 3.4, alkaline phosphatase.  Leukocytosis of 16.3 but otherwise afebrile without signs of sepsis.  Patient with sphincterotomy earlier in 2021.  Plan for ERCP and cholecystectomy on 8/1/2021.  Leukocytosis resolved.  -Metronidazole 500 mg IV every 12 hours Levofloxacin 750 mg IV every 24 hours  -N.p.o.  -IVF  -Gastroenterology consulted, appreciate recommendations  -General surgery consult, appreciate recommendations    Umbilical hernia  -NON incarcerated umbilical hernia containing fat seen on CT abdomen and pelvis.  No associated inflammation.    Crohn's disease  -Not in acute flare  -Remicade every 2 months    HTN  -Metoprolol  mg p.o. daily  -Triamterene-HCTZ p.o. daily  -Hold home ASA until after surgery    Normocytic anemia  -Hemoglobin 12.8 with MCV of 94.  Patient reportedly with 1000 mill blood loss during cholecystectomy, with anticipate hemoglobin to drop further.    Electrolytes: currently within normal limits  Fluids: NS @ 100 ml/hr  Diet: clear liquid  VTE prophylaxis: SCD boots    COVID19 symptom check 8/1/2021  Fevers/Chills/myalgias: NEGATIVE TO ALL  Sick contacts/COVID19 exposure: NEGATIVE TO ALL  Cough/trouble breathing/SOB/sore throat: NEGATIVE TO ALL  Diarrhea: NEGATIVE         Subjective  Cc: abdominal pain    Pt is new to be today.  Personally conducted extensive  chart review prior to visit including labs, imaging, past provider notes and interventions.  Patient seen shortly after surgery, having a considerable amount of abdominal pain.  Denied chest pain, difficulty breathing.    Review of Systems: History obtained from the patient  General ROS: negative  for  - chills, fatigue, fever  ENT ROS: negative for - headaches, hearing change, nasal congestion, nasal discharge  Hematological and Lymphatic ROS: negative for - bleeding problems, blood clots, bruising  Respiratory ROS: negative for - cough, pleuritic pain, shortness of breath  Cardiovascular ROS: negative for - chest pain, dyspnea on exertion, edema  Gastrointestinal ROS: positive for - abdominal pain, negative for constipation, diarrhea, and nausea/vomiting  Genito-Urinary ROS: negative for -  dysuria, hematuria  Musculoskeletal ROS: negative for - gait disturbance, and muscle pain  Neurological ROS: negative for - behavioral changes, confusion, dizziness, numbness/tingling   Dermatological ROS: negative for pruritus, rash    Objective    Physical Examination:   General appearance -sleepy, patient appeared uncomfortable  Mental status -sleepy, oriented to person, place, and time, normal mood, behavior, speech, dress, motor activity, and thought processes  HEENT - sclera anicteric, left eye normal, right eye normal, nares normal and patent, no erythema  Lymphatics - no palpable lymphadenopathy, no hepatosplenomegaly  Respiratory - clear to auscultation, no wheezes, rales or rhonchi, symmetric air entry, no tachypnea, retractions or cyanosis  Cardiac - normal rate, regular rhythm, normal S1, S2, no murmurs, rubs, clicks or gallops, no JVD  Abdomen -bowel sounds normal in all 4 quadrants, deferred rest of abdominal exam surgery.  Cardona bag with tea colored urine  Neurological - sleepy, normal speech, no focal findings or movement disorder noted  Musculoskeletal - no joint tenderness, deformity or swelling, full range of  motion without pain  Extremities - peripheral pulses normal, no pedal edema, no clubbing or cyanosis  Skin - normal coloration and turgor, no rashes, no suspicious skin lesions noted    Temp:  [97.6  F (36.4  C)-98.8  F (37.1  C)] 98.3  F (36.8  C)  Pulse:  [] 65  Resp:  [10-24] 18  BP: (117-161)/(62-95) 147/62  SpO2:  [2 %-98 %] 98 %      Intake/Output Summary (Last 24 hours) at 8/1/2021 0847  Last data filed at 8/1/2021 0448  Gross per 24 hour   Intake 900 ml   Output --   Net 900 ml       Results    Lab Results personally reviewed 8/1  Imaging Results personally reviewed 8/1  Discussed with patient  Reviewed old records     Advanced Care Planning  Discharge Planning discussed with patient  Discussed care with patient for 25 minutes with greater than 50% of time spent in counseling and coordination of care.        This note was created using dragon dictation, any spelling and grammatical errors are unintentional.

## 2021-08-01 NOTE — PLAN OF CARE
Problem: Adult Inpatient Plan of Care  Goal: Plan of Care Review  Outcome: Improving  Flowsheets (Taken 8/1/2021 9130)  Plan of Care Reviewed With:   patient   spouse  Progress: improving   Patient was in surgery most of day shift. Patient got back from PACU at 1510. Settled patient in room. Vitals stable. Wife in room and updated. Pain is tolerable at this time. Lap sites x4 are clean, dry and intact.     Palmira Osman RN 8/1/2021 3:48 PM

## 2021-08-01 NOTE — ANESTHESIA POSTPROCEDURE EVALUATION
Patient: Raza Wilson    Procedure(s):  CHOLECYSTECTOMY, LAPAROSCOPIC  ENDOSCOPIC RETROGRADE CHOLANGIOPANCREATOGRAPHY    Diagnosis:Acute cholecystitis [K81.0]  Ascending cholangitis [K83.09]  Diagnosis Additional Information: No value filed.    Anesthesia Type:  General    Note:  Disposition: Inpatient   Postop Pain Control: Uneventful            Sign Out: Well controlled pain   PONV: No   Neuro/Psych:    Airway/Respiratory: Uneventful            Sign Out: Acceptable/Baseline resp. status   CV/Hemodynamics: Uneventful            Sign Out: Acceptable CV status; No obvious hypovolemia; No obvious fluid overload   Other NRE: NONE   DID A NON-ROUTINE EVENT OCCUR? No           Last vitals:  Vitals Value Taken Time   /74 08/01/21 1500   Temp 37.2  C (99  F) 08/01/21 1450   Pulse 74 08/01/21 1500   Resp 14 08/01/21 1500   SpO2 94 % 08/01/21 1500       Electronically Signed By: Brigitte Cerrato MD  August 1, 2021  4:21 PM

## 2021-08-01 NOTE — PLAN OF CARE
"  Problem: Adult Inpatient Plan of Care  Goal: Plan of Care Review  Outcome: No Change     Problem: Nausea and Vomiting  Goal: Fluid and Electrolyte Balance  Outcome: No Change     Problem: Adult Inpatient Plan of Care  Goal: Absence of Hospital-Acquired Illness or Injury  Intervention: Identify and Manage Fall Risk  Recent Flowsheet Documentation  Taken 7/31/2021 1830 by Lorrie Chiu RN  Safety Promotion/Fall Prevention: room near nurse's station    Patient experienced one episode of emesis.  Patient stated \"it came on suddenly\" .  Nausea resolved after emesis.  No anti-emetic needed.  He declined clear liquids.  Mouth swabs at bedside.  I.V.  Fluids infusing.    Patient denied pain but experiences tenderness with palpation.  No further emesis.    Scheduled to have procedures tomorrow at 8 am.  NPO after MN.  VSS. Afebrile. Slept intermittently.           "

## 2021-08-01 NOTE — PLAN OF CARE
Problem: Nausea and Vomiting  Goal: Fluid and Electrolyte Balance  Outcome: Improving     Problem: Adult Inpatient Plan of Care  Goal: Optimal Comfort and Wellbeing  Outcome: Improving     Patient denies pain.  States that since getting pain meds in the ED, his pain hasn't come back.  Denies nausea.  IV fluids infusing.  IV flagyl given.   Vitals are stable.  Pt has not voided.  Bladder scan was 88 at 0530.  No urge to void.  NPO for surgery this am.

## 2021-08-01 NOTE — ANESTHESIA CARE TRANSFER NOTE
Patient: Raza Wilson    Procedure(s):  CHOLECYSTECTOMY, LAPAROSCOPIC  ENDOSCOPIC RETROGRADE CHOLANGIOPANCREATOGRAPHY    Diagnosis: Acute cholecystitis [K81.0]  Ascending cholangitis [K83.09]  Diagnosis Additional Information: No value filed.    Anesthesia Type:   General     Note:    Oropharynx: oropharynx clear of all foreign objects  Level of Consciousness: drowsy  Oxygen Supplementation: face mask  Level of Supplemental Oxygen (L/min / FiO2): 8  Independent Airway: airway patency satisfactory and stable  Dentition: dentition unchanged  Vital Signs Stable: post-procedure vital signs reviewed and stable  Report to RN Given: handoff report given  Patient transferred to: PACU    Handoff Report: Identifed the Patient, Identified the Reponsible Provider, Reviewed the pertinent medical history, Discussed the surgical course, Reviewed Intra-OP anesthesia mangement and issues during anesthesia, Set expectations for post-procedure period and Allowed opportunity for questions and acknowledgement of understanding      Vitals:  Vitals Value Taken Time   /74 08/01/21 1323   Temp 36.1  C (96.9  F) 08/01/21 1323   Pulse 89 08/01/21 1323   Resp 30 08/01/21 1323   SpO2         Electronically Signed By: KEATON Clark CRNA  August 1, 2021  1:28 PM

## 2021-08-01 NOTE — OP NOTE
Operative Note:  Laparoscopic Cholecystectomy    Name:  Raza Wilson  PCP:  Jeremy Angel  Procedure Date:  7/31/2021 - 8/1/2021      Procedure: Laparoscopic cholecystectomy    Pre-Procedure Diagnosis:  Cholecystitis    Post-Procedure Diagnosis:    Cholecystits acute and chronic    Surgeon(s):  Nigel Ba MD    @CB@    Anesthesia Type:  GET      Findings:  Very enlarged and chronically inflamed gallbladder on a morbidly obese man    Operative Report:    The patient was taken to Operating Room, identified, and the procedure verified as Laparoscopic Cholecystectomy  A Time Out was held.    General endotracheal anesthesia was administered and tolerated well. After the induction, the abdomen was prepped in the usual sterile fashion. The patient was positioned in the supine position. They were sterilely prepped and draped in the usual surgical fashion.    Local anesthetic agent was injected into the skin near the umbilicus and an incision made. A periumbilical incision was made and a 5mm trocar was placed under direct visualization using the optiview technique, and a pneumoperitoneum was brought up to 15 mm Hg. . A 5 mm trocar an 11 mm trocar and a 12 mm trocar were all placed under direct visualization of the laparoscope. skin incisions were infiltrated with a local anesthetic agent before making the incision and placing the trocars.     The gallbladder was identified, the fundus was pierced with aspiration needle the bile within the gallbladder was aspirated although there was not a lot of bile in the space.  The gallbladder was grasped and retracted cephalad up over the inferior edge of the liver. The infundibulum of the gallbladder was grasped and retracted laterally, exposing the neck of the gallbladder.  The visceral peritoneum overlying the angle of Calot was dissected through with hook cautery.  A lot of blunt dissection with the suction  was done to help identify the  structures at the neck of the gallbladder at the gallbladder was very thickened and difficult to do work around.  I dissected out what appeared to be the cystic duct and the cystic artery.  There were multiple vessels coming in at this location of the gallbladder I got into some bleeding at the site and I dissected out the vessels the best I could and put clips on them.  Once I got good hemostasis I still did not have good visualization of the cystic duct itself and what appeared to be the cystic duct was quite thickened and too big to accommodate the extra-large manual clips.  With that I did a dome down technique by dissecting the gallbladder free from the gallbladder fossa.  After considerable amount of work dissecting the gallbladder free from its deeply intrahepatic space and the very scarred in aspect of the gallbladder as it was been chronically inflamed.  Ultimately the gallbladder is retracted out and away from the gallbladder fossa and I used an Endo DIANA 45 mm stapler with a white load to divide the region at the neck of the gallbladder extending out onto the cystic duct.  This region was stapled across.      The gallbladder was placed in Endo Catch bag and brought out through the 12 mm trocar site.  I packed the gallbladder fossa with Surgicel and 4 x 4 gauze.  This was effective in achieving complete hemostasis.  The neck of the gallbladder was evaluated carefully for hemostasis 1 additional clip was placed just lateral to the staple line to achieve complete hemostasis.  I placed a 19 Torsten drain which came out through the right lateral trocar site.  Each of the larger trocar sites were closed at the level of the fascia with a 0 Vicryl suture placed under direct visualization utilizing an Endo fascial closure device and 0 Vicryl suture.       Pneumoperitoneum was reduced.  The trocars were removed.  The skin was then closed with 4-0 monocryl and a sterile dressing was applied.    Instrument, sponge, and  needle counts were correct at closure and at the conclusion of the case.    Estimated Blood Loss:   1000 ml    Specimens:    Gall Bladder       Drains:   Closed/Suction Drain 1 Right RUQ Bulb 19 Setswana (Active)       Complications:    None     Note to  this very difficult cholecystectomy in a morbidly obese patient with a chronically inflamed gallbladder with difficult to identify anatomy that took 3-1/2 hours this is 2 and half hours longer than average gallbladder time this surgery should be listed with a modifier 22.    Nigel Ba MD     Date: 8/1/2021  Time: 12:07 PM

## 2021-08-01 NOTE — ANESTHESIA PREPROCEDURE EVALUATION
Anesthesia Pre-Procedure Evaluation    Patient: Raza Wilson   MRN: [id number] : 1945        Preoperative Diagnosis: Acute cholecystitis [K81.0]  Ascending cholangitis [K83.09]   Procedure : Procedure(s):  CHOLECYSTECTOMY, LAPAROSCOPIC     Past Medical History:   Diagnosis Date     Abnormal involuntary movements      Achilles bursitis      BEVERLY (acute kidney injury) (H)      Crohn's colitis (H)      Edema      History of anesthesia complications     shivers and shakes after colonoscopy     Hypertension      Obesity       Past Surgical History:   Procedure Laterality Date     COLONOSCOPY       OR ERCP DX COLLECTION SPECIMEN BRUSHING/WASHING N/A 1/15/2021    Procedure: ENDOSCOPIC RETROGRADE CHOLANGIOPANCREATOGRAPHY, BILLARY SPHINCTEROTOMY, STONE EXTRACTION;  Surgeon: Syd Esteban MD;  [place] [facility] [place]  Service: Gastroenterology     OR ESOPHAGOGASTRODUODENOSCOPY US SCOPE W/ADJ STRXRS N/A 1/15/2021    Procedure: ENDOSCOPIC ULTRASOUND;  Surgeon: Syd Esteban MD;  [place] [facility] [place]: G[place]ology     TONSILLECTOMY & ADENOIDECTOMY       XR ERCP BILIARY ONLY  1/15/2021      Allergies   Allergen Reactions     Lisinopril Cough     Penicillins Unknown     Trimethoprim Unknown     Bactrim [Sulfamethoxazole W/Trimethoprim] Rash      Social History     Tobacco Use     Smoking status: Never Smoker     Smokeless tobacco: Never Used   Substance Use Topics     Alcohol use: Not Currently      Wt Readings from Last 1 Encounters:   21 (!) 161.1 kg (355 lb 2.6 oz)        Anesthesia Evaluation   Pt has had prior anesthetic. Type: General.    History of anesthetic complications   slow to wake up and shaking.    ROS/MED HX  ENT/Pulmonary:     (+) ISATU risk factors, hypertension, obese,     Neurologic:  - neg neurologic ROS     Cardiovascular:     (+) hypertension-----    METS/Exercise Tolerance:  Comment: Walks with walker or cane due to a painful knee   Hematologic:  - neg  hematologic  ROS     Musculoskeletal:   (+) arthritis,     GI/Hepatic: Comment: Ascending cholangitis    (+) cholecystitis/cholelithiasis,  (-) GERD   Renal/Genitourinary:    (-) renal disease   Endo:     (+) Obesity,     Psychiatric/Substance Use:  - neg psychiatric ROS     Infectious Disease:       Malignancy:  - neg malignancy ROS     Other:  - neg other ROS          Physical Exam    Airway        Mallampati: II   TM distance: > 3 FB   Neck ROM: full     Respiratory Devices and Support         Dental  no notable dental history         Cardiovascular   cardiovascular exam normal       Rhythm and rate: regular and normal     Pulmonary   pulmonary exam normal        breath sounds clear to auscultation           OUTSIDE LABS:  CBC:   Lab Results   Component Value Date    WBC 8.4 08/01/2021    WBC 16.3 (H) 07/31/2021    HGB 12.8 (L) 08/01/2021    HGB 14.7 07/31/2021    HCT 39.8 (L) 08/01/2021    HCT 45.6 07/31/2021     08/01/2021     07/31/2021     BMP:   Lab Results   Component Value Date     08/01/2021     07/31/2021    POTASSIUM 4.6 08/01/2021    POTASSIUM 3.7 07/31/2021    CHLORIDE 101 08/01/2021    CHLORIDE 97 (L) 07/31/2021    CO2 31 08/01/2021    CO2 25 07/31/2021    BUN 18 08/01/2021    BUN 16 07/31/2021    CR 0.80 08/01/2021    CR 0.98 07/31/2021     08/01/2021     (H) 07/31/2021     COAGS:   Lab Results   Component Value Date    INR 1.13 07/31/2021     POC: No results found for: BGM, HCG, HCGS  HEPATIC:   Lab Results   Component Value Date    ALBUMIN 2.5 (L) 08/01/2021    PROTTOTAL 6.5 08/01/2021     (H) 08/01/2021     (H) 08/01/2021    ALKPHOS 386 (H) 08/01/2021    BILITOTAL 3.4 (H) 08/01/2021     OTHER:   Lab Results   Component Value Date    LACT 2.0 07/31/2021    TRACI 8.9 08/01/2021       Anesthesia Plan    ASA Status:  3   NPO Status:  NPO Appropriate    Anesthesia Type: General.     - Airway: ETT   Induction: Intravenous.   Maintenance: Balanced.         Consents         - Extended Intubation/Ventilatory Support Discussed: No.      - Patient is DNR/DNI Status: No    Use of blood products discussed: No .     Postoperative Care    Pain management: IV analgesics, Oral pain medications.   PONV prophylaxis: Ondansetron (or other 5HT-3), Dexamethasone or Solumedrol     Comments:                Brigitte Cerrato MD

## 2021-08-01 NOTE — ANESTHESIA PROCEDURE NOTES
Airway       Patient location during procedure: OR       Procedure Start/Stop Times: 8/1/2021 8:08 AM  Staff -        CRNA: Tricia Gallardo APRN CRNA       Performed By: CRNA  Consent for Airway        Urgency: elective  Indications and Patient Condition       Indications for airway management: kostas-procedural         Mask difficulty assessment: 3 - difficult mask (inadequate, unstable, or two providers) +/- NMBA (2 hand mask)    Final Airway Details       Final airway type: endotracheal airway       Successful airway: Oral  Endotracheal Airway Details        Cuffed: yes       Successful intubation technique: video laryngoscopy       VL Blade Size: Glidescope 1       Grade View of Cords: 1 (EZ GLIDE)       Adjucts: stylet       Position: Right       Measured from: lips       Secured at (cm): 22    Post intubation assessment        Placement verified by: capnometry, equal breath sounds and chest rise        Number of attempts at approach: 1       Secured with: silk tape       Ease of procedure: easy       Dentition: Intact and Unchanged

## 2021-08-02 ENCOUNTER — APPOINTMENT (OUTPATIENT)
Dept: PHYSICAL THERAPY | Facility: HOSPITAL | Age: 76
DRG: 418 | End: 2021-08-02
Attending: EMERGENCY MEDICINE
Payer: MEDICARE

## 2021-08-02 ENCOUNTER — APPOINTMENT (OUTPATIENT)
Dept: OCCUPATIONAL THERAPY | Facility: HOSPITAL | Age: 76
DRG: 418 | End: 2021-08-02
Attending: EMERGENCY MEDICINE
Payer: MEDICARE

## 2021-08-02 PROBLEM — D62 ANEMIA DUE TO BLOOD LOSS, ACUTE: Status: ACTIVE | Noted: 2021-08-02

## 2021-08-02 LAB
ALBUMIN SERPL-MCNC: 2.4 G/DL (ref 3.5–5)
ALP SERPL-CCNC: 246 U/L (ref 45–120)
ALT SERPL W P-5'-P-CCNC: 205 U/L (ref 0–45)
ANION GAP SERPL CALCULATED.3IONS-SCNC: 6 MMOL/L (ref 5–18)
AST SERPL W P-5'-P-CCNC: 125 U/L (ref 0–40)
BASOPHILS # BLD AUTO: 0 10E3/UL (ref 0–0.2)
BASOPHILS NFR BLD AUTO: 0 %
BILIRUB DIRECT SERPL-MCNC: 0.5 MG/DL
BILIRUB SERPL-MCNC: 1 MG/DL (ref 0–1)
BUN SERPL-MCNC: 21 MG/DL (ref 8–28)
CALCIUM SERPL-MCNC: 8 MG/DL (ref 8.5–10.5)
CHLORIDE BLD-SCNC: 100 MMOL/L (ref 98–107)
CO2 SERPL-SCNC: 32 MMOL/L (ref 22–31)
CREAT SERPL-MCNC: 0.81 MG/DL (ref 0.7–1.3)
EOSINOPHIL # BLD AUTO: 0 10E3/UL (ref 0–0.7)
EOSINOPHIL NFR BLD AUTO: 0 %
ERYTHROCYTE [DISTWIDTH] IN BLOOD BY AUTOMATED COUNT: 13.3 % (ref 10–15)
GFR SERPL CREATININE-BSD FRML MDRD: 87 ML/MIN/1.73M2
GLUCOSE BLD-MCNC: 116 MG/DL (ref 70–125)
GLUCOSE BLDC GLUCOMTR-MCNC: 112 MG/DL (ref 70–125)
HCT VFR BLD AUTO: 35 % (ref 40–53)
HGB BLD-MCNC: 11 G/DL (ref 13.3–17.7)
IMM GRANULOCYTES # BLD: 0.1 10E3/UL
IMM GRANULOCYTES NFR BLD: 1 %
LYMPHOCYTES # BLD AUTO: 1.4 10E3/UL (ref 0.8–5.3)
LYMPHOCYTES NFR BLD AUTO: 15 %
MCH RBC QN AUTO: 30.1 PG (ref 26.5–33)
MCHC RBC AUTO-ENTMCNC: 31.4 G/DL (ref 31.5–36.5)
MCV RBC AUTO: 96 FL (ref 78–100)
MONOCYTES # BLD AUTO: 1 10E3/UL (ref 0–1.3)
MONOCYTES NFR BLD AUTO: 10 %
NEUTROPHILS # BLD AUTO: 7.2 10E3/UL (ref 1.6–8.3)
NEUTROPHILS NFR BLD AUTO: 74 %
NRBC # BLD AUTO: 0 10E3/UL
NRBC BLD AUTO-RTO: 0 /100
PLATELET # BLD AUTO: 224 10E3/UL (ref 150–450)
POTASSIUM BLD-SCNC: 4.3 MMOL/L (ref 3.5–5)
PROT SERPL-MCNC: 5.8 G/DL (ref 6–8)
RBC # BLD AUTO: 3.66 10E6/UL (ref 4.4–5.9)
SODIUM SERPL-SCNC: 138 MMOL/L (ref 136–145)
WBC # BLD AUTO: 9.5 10E3/UL (ref 4–11)

## 2021-08-02 PROCEDURE — 82248 BILIRUBIN DIRECT: CPT | Performed by: FAMILY MEDICINE

## 2021-08-02 PROCEDURE — 36415 COLL VENOUS BLD VENIPUNCTURE: CPT | Performed by: FAMILY MEDICINE

## 2021-08-02 PROCEDURE — 97166 OT EVAL MOD COMPLEX 45 MIN: CPT | Mod: GO

## 2021-08-02 PROCEDURE — 99233 SBSQ HOSP IP/OBS HIGH 50: CPT | Performed by: FAMILY MEDICINE

## 2021-08-02 PROCEDURE — 97110 THERAPEUTIC EXERCISES: CPT | Mod: GO

## 2021-08-02 PROCEDURE — 250N000011 HC RX IP 250 OP 636: Performed by: SURGERY

## 2021-08-02 PROCEDURE — 97116 GAIT TRAINING THERAPY: CPT | Mod: GP

## 2021-08-02 PROCEDURE — 250N000013 HC RX MED GY IP 250 OP 250 PS 637: Performed by: SURGERY

## 2021-08-02 PROCEDURE — 97535 SELF CARE MNGMENT TRAINING: CPT | Mod: GO

## 2021-08-02 PROCEDURE — 258N000003 HC RX IP 258 OP 636: Performed by: FAMILY MEDICINE

## 2021-08-02 PROCEDURE — C9113 INJ PANTOPRAZOLE SODIUM, VIA: HCPCS | Performed by: SURGERY

## 2021-08-02 PROCEDURE — 250N000013 HC RX MED GY IP 250 OP 250 PS 637: Performed by: FAMILY MEDICINE

## 2021-08-02 PROCEDURE — 85025 COMPLETE CBC W/AUTO DIFF WBC: CPT | Performed by: SURGERY

## 2021-08-02 PROCEDURE — 99024 POSTOP FOLLOW-UP VISIT: CPT | Performed by: NURSE PRACTITIONER

## 2021-08-02 PROCEDURE — 97530 THERAPEUTIC ACTIVITIES: CPT | Mod: GP

## 2021-08-02 PROCEDURE — 97162 PT EVAL MOD COMPLEX 30 MIN: CPT | Mod: GP

## 2021-08-02 PROCEDURE — 120N000001 HC R&B MED SURG/OB

## 2021-08-02 PROCEDURE — 82040 ASSAY OF SERUM ALBUMIN: CPT | Performed by: SURGERY

## 2021-08-02 RX ORDER — SIMETHICONE 80 MG
80 TABLET,CHEWABLE ORAL EVERY 6 HOURS PRN
Status: DISCONTINUED | OUTPATIENT
Start: 2021-08-02 | End: 2021-08-06 | Stop reason: HOSPADM

## 2021-08-02 RX ORDER — SODIUM CHLORIDE 9 MG/ML
INJECTION, SOLUTION INTRAVENOUS CONTINUOUS
Status: DISCONTINUED | OUTPATIENT
Start: 2021-08-02 | End: 2021-08-04

## 2021-08-02 RX ADMIN — METOPROLOL SUCCINATE 100 MG: 25 TABLET, EXTENDED RELEASE ORAL at 08:37

## 2021-08-02 RX ADMIN — SODIUM CHLORIDE: 9 INJECTION, SOLUTION INTRAVENOUS at 22:10

## 2021-08-02 RX ADMIN — TRIAMTERENE AND HYDROCHLOROTHIAZIDE 1 TABLET: 37.5; 25 TABLET ORAL at 08:37

## 2021-08-02 RX ADMIN — PANTOPRAZOLE SODIUM 40 MG: 40 INJECTION, POWDER, FOR SOLUTION INTRAVENOUS at 08:37

## 2021-08-02 RX ADMIN — METRONIDAZOLE 500 MG: 500 INJECTION, SOLUTION INTRAVENOUS at 04:40

## 2021-08-02 RX ADMIN — METRONIDAZOLE 500 MG: 500 INJECTION, SOLUTION INTRAVENOUS at 16:33

## 2021-08-02 RX ADMIN — LEVOFLOXACIN 750 MG: 5 INJECTION, SOLUTION INTRAVENOUS at 16:33

## 2021-08-02 RX ADMIN — SODIUM CHLORIDE 500 ML: 9 INJECTION, SOLUTION INTRAVENOUS at 15:00

## 2021-08-02 RX ADMIN — KETOROLAC TROMETHAMINE 15 MG: 15 INJECTION, SOLUTION INTRAMUSCULAR; INTRAVENOUS at 08:46

## 2021-08-02 RX ADMIN — SIMETHICONE 80 MG: 80 TABLET, CHEWABLE ORAL at 16:33

## 2021-08-02 RX ADMIN — KETOROLAC TROMETHAMINE 15 MG: 15 INJECTION, SOLUTION INTRAMUSCULAR; INTRAVENOUS at 16:33

## 2021-08-02 RX ADMIN — Medication 1 MG: at 22:19

## 2021-08-02 RX ADMIN — HEPARIN SODIUM 5000 UNITS: 10000 INJECTION, SOLUTION INTRAVENOUS; SUBCUTANEOUS at 06:18

## 2021-08-02 ASSESSMENT — ACTIVITIES OF DAILY LIVING (ADL): PREVIOUS_RESPONSIBILITIES: MEDICATION MANAGEMENT

## 2021-08-02 NOTE — PLAN OF CARE
Problem: Adult Inpatient Plan of Care  Goal: Plan of Care Review  Outcome: Improving    Problem: Pain Acute  Goal: Acceptable Pain Control and Functional Ability  Outcome: Improving     Problem: Adult Inpatient Plan of Care  Goal: Absence of Hospital-Acquired Illness or Injury  Intervention: Prevent and Manage VTE (Venous Thromboembolism) Risk  Recent Flowsheet Documentation  Taken 8/1/2021 1600 by Lorrie Chiu RN  VTE Prevention/Management: pneumatic compression device    Vital signs stable.  Afebrile.  Lap sites and WILLA site remains clean,dry and intact.  Now on 3L Oxygen with O2 sats between 93 and 95%.  Cardona patent and draining amarilis urine.     Dilaudid given twice for incisional pain.  Now rating pain level at 1/10. Patient drowsy most of evening. Now more alert.    No nausea but declined clear liquids.  Tolerated meds with sips of water.  Takes occasional sips of water.  I.V. fluids infusing.  Encouraged IS use.  Poor effort - Patient using IS between 500 and 750.    Will continue to monitor.

## 2021-08-02 NOTE — PLAN OF CARE
Problem: Nausea and Vomiting  Goal: Fluid and Electrolyte Balance  Outcome: Improving   Denies nausea overnight.  Clear liquid diet ordered, has only requested water.    BS present, denies flatus.    Problem: Pain Acute  Goal: Acceptable Pain Control and Functional Ability  Outcome: Improving   Reports mild discomfort at surgical site.  Cardona removed at 0620.

## 2021-08-02 NOTE — PROGRESS NOTES
08/02/21 0832   Quick Adds   Type of Visit Initial Occupational Therapy Evaluation   Living Environment   People in home spouse   Current Living Arrangements house   Home Accessibility no concerns   Self-Care   Usual Activity Tolerance fair   Equipment Currently Used at Home grab bar, toilet;raised toilet seat;walker, rolling   Instrumental Activities of Daily Living (IADL)   Previous Responsibilities medication management   Disability/Function   Hearing Difficulty or Deaf no   Difficulty Communicating no   Dressing/Bathing Difficulty no   Toileting issues no   General Information   Onset of Illness/Injury or Date of Surgery 08/31/21   Referring Physician herb carrasco   General Observations and Info slow moving   Cognitive Status Examination   Orientation Status person;place   Follows Commands follows one-step commands   Pain Assessment   Patient Currently in Pain Yes, see Vital Sign flowsheet   Posture   Posture forward head position   Range of Motion Comprehensive   General Range of Motion no range of motion deficits identified   Strength Comprehensive (MMT)   General Manual Muscle Testing (MMT) Assessment no strength deficits identified   Bed Mobility   Bed Mobility rolling left;supine-sit   Rolling Left Martin (Bed Mobility) maximum assist (25% patient effort);1 person to manage equipment   Assistive Device (Bed Mobility) bed rails   Transfers   Transfers bed-chair transfer;sit-stand transfer   Transfer Skill: Bed to Chair/Chair to Bed   Bed-Chair Martin (Transfers) moderate assist (50% patient effort);minimum assist (75% patient effort);2 person assist   Assistive Device (Bed-Chair Transfers) rolling walker   Transfer Comments slow moving/weak   Sit-Stand Transfer   Sit-Stand Martin (Transfers) moderate assist (50% patient effort);2 person assist   Lower Body Dressing Assessment   Martin Level (Lower Body Dressing) dependent (less than 25% patient effort)   Position (Lower Body  Dressing) edge of bed sitting   Grooming Assessment   [place] Level (Grooming) set up;supervision   Position (Grooming) edge of bed sitting   Clinical Impression   Criteria for Skilled Therapeutic Interventions Met (OT) yes   OT Diagnosis decreased adl's   OT Problem List-Impairments impacting ADL activity tolerance impaired;balance;mobility;strength   Assessment of Occupational Performance 1-3 Performance Deficits   Identified Performance Deficits endurance/adl's/strength   Planned Therapy Interventions (OT) ADL retraining;balance training;bed mobility training;strengthening;transfer training   Clinical Decision Making Complexity (OT) moderate complexity   Therapy Frequency (OT) Daily   Predicted Duration of Therapy 7 days   Anticipated Equipment Needs Upon Discharge (OT) reacher   Risk & Benefits of therapy have been explained evaluation/treatment results reviewed   OT Discharge Planning    OT Discharge Recommendation (DC Rec) Transitional Care Facility

## 2021-08-02 NOTE — PROGRESS NOTES
Assessment & Plan   76-year-old male with Crohn's disease, HTN presents with acute cholecystitis and choledocholithiasis.    Active Problems:    Acute cholecystitis    Choledocholithiasis    Umbilical hernia without obstruction and without gangrene    Liver dysfunction    Crohn's disease (H)    Benign essential hypertension    Normocytic anemia    Anemia due to blood loss, acute       Acute cholecystitis and choledocholithiasis  -Ultrasound abdomen with common bile duct dilation to 10 mm, gallbladder sludge and small stones as well as pericholecystic inflammation.  CT abdomen pelvis also with signs of acute cholecystitis.  Patient with elevated ALT, AST, bilirubin 3.4, alkaline phosphatase on admission.  Leukocytosis of 16.3 but otherwise afebrile without signs of sepsis.  Patient with sphincterotomy earlier in 2021. ERCP without any obstruction identified, cholecystectomy 8/1 fairly complicated with large blood loss.   Leukocytosis resolved, hepatic function improving and bilirubin again normal postoperatively.   -Metronidazole 500 mg IV every 12 hours   -Levofloxacin 750 mg IV every 24 hours  -Continue IVF, patient with poor PO intake  -Gastroenterology consulted, appreciate recommendations  -General surgery consult, appreciate recommendations    Umbilical hernia  -NON incarcerated umbilical hernia containing fat seen on CT abdomen and pelvis.  No associated inflammation.    Crohn's disease  -Not in acute flare  -Remicade every 2 months    HTN  -Metoprolol  mg p.o. daily  -Triamterene-HCTZ p.o. daily  -Hold home ASA until after surgery    Normocytic anemia, acute blood loss  -Hemoglobin 11 with MCV of 94.  Patient with 1000 ml blood loss from surgery    Electrolytes: currently within normal limits  Fluids: NS @ 100 ml/hr  Diet: clear liquid  VTE prophylaxis: SCD boots    COVID19 symptom check 8/2/2021  Fevers/Chills/myalgias: NEGATIVE TO ALL  Sick contacts/COVID19 exposure: NEGATIVE TO ALL  Cough/trouble  breathing/SOB/sore throat: NEGATIVE TO ALL  Diarrhea: NEGATIVE       Subjective  Cc: abdominal pain    Patient states he feels pretty terrible today.  Has pretty significant right upper quadrant abdominal pain, especially with any movement.  Also just feels very tired, difficult to take a deep breath in.  Denies chest pain, cough, fevers, chills, nausea or vomiting.    Review of Systems: History obtained from the patient  General ROS: negative  for  - chills, fever, positive for fatigue  ENT ROS: negative for - headaches, hearing change, nasal congestion, nasal discharge  Hematological and Lymphatic ROS: negative for - bleeding problems, blood clots, bruising  Respiratory ROS: negative for - cough, pleuritic pain, shortness of breath  Cardiovascular ROS: negative for - chest pain, dyspnea on exertion, edema  Gastrointestinal ROS: positive for - abdominal pain, negative for constipation, diarrhea, and nausea/vomiting  Genito-Urinary ROS: negative for -  dysuria, hematuria  Musculoskeletal ROS: negative for - gait disturbance, and muscle pain  Neurological ROS: negative for - behavioral changes, confusion, dizziness, numbness/tingling   Dermatological ROS: negative for pruritus, rash    Objective    Physical Examination:   General appearance -alert, patient appeared uncomfortable and is obese, aler alert and oriented x3  Mental status -alert, oriented to person, place, and time, normal mood, behavior, speech, dress, motor activity, and thought processes  HEENT - sclera anicteric, left eye normal, right eye normal, nares normal and patent, no erythema  Lymphatics - no palpable lymphadenopathy, no hepatosplenomegaly  Respiratory - clear to auscultation, no wheezes, rales or rhonchi, symmetric air entry, no tachypnea, retractions or cyanosis  Cardiac - normal rate, regular rhythm, normal S1, S2, no murmurs, rubs, clicks or gallops, no JVD  Abdomen -bowel sounds normal in all 4 quadrants, patient with right-sided WILLA drain  with moderate amount of dark red blood in the bulb.  Right upper quadrant diffusely TTP.  Neurological -alert, normal speech, no focal findings or movement disorder noted  Musculoskeletal - no joint tenderness, deformity or swelling, full range of motion without pain  Extremities - peripheral pulses normal, no pedal edema, no clubbing or cyanosis  Skin -patient appears mildly diaphoretic    Temp:  [96.9  F (36.1  C)-99.3  F (37.4  C)] 98.7  F (37.1  C)  Pulse:  [71-89] 85  Resp:  [14-30] 18  BP: (111-185)/(53-82) 134/82  FiO2 (%):  [8 %] 8 %  SpO2:  [79 %-98 %] 95 %      Intake/Output Summary (Last 24 hours) at 8/1/2021 0847  Last data filed at 8/1/2021 0448  Gross per 24 hour   Intake 900 ml   Output --   Net 900 ml       Results    Lab Results personally reviewed 8/2  Imaging Results personally reviewed 8/1  Discussed with patient  Reviewed old records     Advanced Care Planning  Discharge Planning discussed with patient  Discussed care with patient for 35 minutes with greater than 50% of time spent in counseling and coordination of care.    Patient's wife Carla updated at bedside on 8/2    This note was created using dragon dictation, any spelling and grammatical errors are unintentional.

## 2021-08-02 NOTE — CONSULTS
Care Management Initial Consult    General Information  Assessment completed with: Patient, Spouse or significant other, pt and spouse  Type of CM/SW Visit: Initial Assessment    Primary Care Provider verified and updated as needed: Yes   Readmission within the last 30 days: no previous admission in last 30 days      Reason for Consult: discharge planning  Advance Care Planning: Advance Care Planning Reviewed: other (comment) (wife will bring healthcare directive on 8/3 to be scanned)       Communication Assessment  Patient's communication style: spoken language (English or Bilingual)    Hearing Difficulty or Deaf: no   Wear Glasses or Blind: no    Cognitive  Cognitive/Neuro/Behavioral: WDL  Level of Consciousness: alert     Orientation: oriented x 4             Living Environment:   People in home: spouse  Carla brown  Current living Arrangements: house      Able to return to prior arrangements: no    Family/Social Support:  Care provided by: self  Provides care for: no one  Marital Status:   Wife  Carla       Description of Support System: Supportive, Involved    Support Assessment: Adequate family and caregiver support, Adequate social supports    Current Resources:   Patient receiving home care services: No     Community Resources: None  Equipment currently used at home: walker, standard, cane, straight, raised toilet seat, grab bar, tub/shower  Supplies currently used at home: None    Employment/Financial:  Employment Status:          Financial Concerns: No concerns identified   Referral to Financial Counselor: No    Lifestyle & Psychosocial Needs:  Social Determinants of Health     Tobacco Use: Low Risk      Smoking Tobacco Use: Never Smoker     Smokeless Tobacco Use: Never Used   Alcohol Use:      Frequency of Alcohol Consumption:      Average Number of Drinks:      Frequency of Binge Drinking:    Financial Resource Strain:      Difficulty of Paying Living Expenses:    Food Insecurity:      Worried  "About Running Out of Food in the Last Year:      Ran Out of Food in the Last Year:    Transportation Needs:      Lack of Transportation (Medical):      Lack of Transportation (Non-Medical):    Physical Activity:      Days of Exercise per Week:      Minutes of Exercise per Session:    Stress:      Feeling of Stress :    Social Connections:      Frequency of Communication with Friends and Family:      Frequency of Social Gatherings with Friends and Family:      Attends Caodaism Services:      Active Member of Clubs or Organizations:      Attends Club or Organization Meetings:      Marital Status:    Intimate Partner Violence:      Fear of Current or Ex-Partner:      Emotionally Abused:      Physically Abused:      Sexually Abused:    Depression:      PHQ-2 Score:    Housing Stability:      Unable to Pay for Housing in the Last Year:      Number of Places Lived in the Last Year:      Unstable Housing in the Last Year:      Functional Status:  Prior to admission patient needed assistance: with bathing, housekeeping, meal preparation and shopping     Mental Health Status:  Mental Health Status: No Current Concerns       Chemical Dependency Status: No current concerns        Values/Beliefs:  Spiritual, Cultural Beliefs, Caodaism Practices, Values that affect care:       none        Additional Information:  Met with patient and spouse, Carla, to review role of care management, progression of care and possible need for services at discharge, including OP services, home care, or skilled nursing care.   [place]. Pt states he has been independent with most ADLs, including driving. He ambulates independently with a walker.  His wife provides assistance with bathing, meal preparation, shopping, and housekeeping. Pt states he has \"bad knees\" so it is hard to stand for long periods.   Discussed recommendation for TCU placement and provided list of TCUs in " the [facility].  Pt is in agreement with TCU placement. They reside in [place] and request referral to [facility]. Pt's wife will review TCU list this evening and check Medicare.gov website and will provide additional choices tomorrow. Referral sent to [facility] TCU.     Roberta Colunga RN

## 2021-08-02 NOTE — PROGRESS NOTES
GASTROENTEROLOGY PROGRESS NOTE     SUBJECTIVE   Tolerating clear liquids. Has been up with therapy. Donny drains in place. No flatus. LFTs trending down.      OBJECTIVE     Vitals Blood pressure 139/66, pulse 78, temperature 98.5  F (36.9  C), temperature source Oral, resp. rate 18, SpO2 98 %.          Physical Exam   General: awake, alert, responds appropriately    Cardiovascular: S1S2, no edema    Chest: lungs are clear     Abdomen: +bs, soft, mild right upper abdominal tenderness.     Neurologic: grossly intact        LABORATORY    ELECTROLYTE PANEL   Recent Labs   Lab 08/02/21  0836 08/02/21  0709 08/01/21 0412 07/31/21  1219   NA  --  138 138 136   POTASSIUM  --  4.3 4.6 3.7   CHLORIDE  --  100 101 97*   CO2  --  32* 31 25    116 109 164*   CR  --  0.81 0.80 0.98   BUN  --  21 18 16      HEMATOLOGY PANEL   Recent Labs   Lab 08/02/21  0709 08/01/21  1443 08/01/21 0412 07/31/21  1132   HGB 11.0* 12.8* 12.8* 14.7   MCV 96  --  94 93   WBC 9.5  --  8.4 16.3*    188 214 259   INR  --   --   --  1.13      LIVER AND PANCREAS PANEL   Recent Labs   Lab 08/02/21  0709 08/01/21  0412 07/31/21  1219   * 288* 193*   * 262* 159*   ALKPHOS 246* 386* 416*   BILITOTAL 1.0 3.4* 3.0*     IMAGING STUDIES    ABD US 7/31/21                                                                 IMPRESSION:     1.  Cholelithiasis/biliary sludge and multiple findings consistent with acute cholecystitis.  2.  Dilated common bile duct measuring up to 10 mm. Distal common bile duct is obscured. No choledocholithiasis within the imaged CBD. Possible concurrent ascending cholangitis.    CT abdomen and pelvis   IMPRESSION:      1.  Acute cholecystitis with marked and diffuse wall thickening, mucosal hyperenhancement extending to the cystic duct and pericholecystic inflammatory stranding. No calcified gallstones.  2.  Dilated common bile duct measuring up to 9 mm. No common duct stones are detected.  3.  Fat-containing  umbilical hernia without associated inflammation.  4.  Distal descending and sigmoid diverticulosis but no diverticulitis.  I have reviewed the current diagnostic and laboratory tests.            ERCP 8/1/21  Findings:        A  film of the abdomen was obtained. Surgical clips, consistent        with a previous cholecystectomy, were seen in the area of the right        upper quadrant of the abdomen. The esophagus was successfully intubated        under direct vision without detailed examination of the pharynx, larynx,        and associated structures, and upper GI tract. The upper GI tract was        grossly normal. A biliary sphincterotomy had been performed. The        sphincterotomy appeared open. Deep bile duct cannulation was obtained        with the 11.5 mm balloon. A 0.025 inch x 270 cm straight Visiglide wire        was passed into the biliary tree. Contrast was injected into the biliary        tree. The lower third of the main bile duct contained filling defect        thought to be a stone vs refluxing air bubble. The biliary        sphincterotomy was extended to a total of 12 mm in length with a        monofilament traction (standard) sphincterotome using ERBE        electrocautery. There was no post-sphincterotomy bleeding. The biliary        tree was swept with an 8.5 mm balloon and 11.5 mm balloon starting at        the bifurcation. Nothing was found                                                                                     Moderate Sedation:        Moderate sedation not used   Impression:          - No evidence of retained CBD stone. Biliary                        sphincterotomy extended and CBD swept multiple times                        with no stones. Suspect that the LFT elevation is                        secondary to significant cholecystitis.   Recommendation:      - Return patient to hospital [facility] for ongoing care.                        - No anticoagulation of any kind (including  DVT                        prophylaxis) for 72 hours. ASA and NSAIDs OK to use.   IMPRESSION   Cholecystitis s/p lap cholecystectomy 8/1/21, This is a 76 year old male with history of obesity, Crohn's colitis (on Infliximab) and htn who was admitted with abdominal pain. LFTs markedly abnormal. Abdominal ultrasound showed CBD of 10mm and findings of cholecystitis.     The patient undewent ERCP 1/2021 which revealed choledocholithiasis s/p sphincterotomy, but declined cholecystectomy at that time. ERCP was repeated 8/1/21 and did not show retained CBD stone. Biliary sphincterotomy was extended. The patient is s/p lap cholecystectomy 8/1/21. LFTs trending down. WBC is down.          PLAN   Diet advances per surgery. Trend LFTs-- should continue to improve.   Increase activity.   No anticoagulants/blood thinners [id number] after sphincterotomy.   [facility] will sign off.   The patient will plan to have outpatient f/up at [facility] w/ Dr Danielle (as scheduled).        Eve Yo PA-C  Thank you for the opportunity to participate in the care of this patient.   Please feel free to call me with any questions or concerns.  Phone number [phone].

## 2021-08-02 NOTE — PROGRESS NOTES
"Physical Therapy     08/02/21 0900   Quick Adds   Type of Visit Initial PT Evaluation   Living Environment   People in home spouse   Current Living Arrangements house   Home Accessibility no concerns   Living Environment Comments no stairs inside or out of home   Self-Care   Usual Activity Tolerance fair   Current Activity Tolerance poor   Regular Exercise No   Equipment Currently Used at Home walker, rolling   Activity/Exercise/Self-Care Comment uses FWW around the house, also has 4WW   General Information   Onset of Illness/Injury or Date of Surgery 07/31/21   Referring Physician Nigel Ba   Patient/Family Therapy Goals Statement (PT) go home, less pain   Pertinent History of Current Problem (include personal factors and/or comorbidities that impact the POC) severe gallstones, s/p lap bartolo   Existing Precautions/Restrictions abdominal;fall   Heart Disease Risk Factors Overweight;Gender;Lack of physical activity   General Observations pt states throughout session, \"if this pain would just go away, it'd be fine. I could do it.\"    Cognition   Cognitive Status Comments cooperative   Pain Assessment   Patient Currently in Pain Yes, see Vital Sign flowsheet  (incisional, unrated.)   Posture    Posture Protracted shoulders   Posture Comments flexed 2/2 abd pain   Range of Motion (ROM)   ROM Quick Adds ROM WFL   Strength   Manual Muscle Testing Quick Adds Deficits observed during functional mobility   Strength Comments LEs deconditioned.   Transfers   Transfers sit-stand transfer   Transfer Safety Concerns Noted decreased weight-shifting ability   Impairments Contributing to Impaired Transfers pain   Sit-Stand Transfer   Sit-Stand [place] (Transfers) moderate assist (50% patient effort)   Assistive Device (Sit-Stand Transfers) bariatric;walker, front-wheeled   Sit/Stand Transfer Comments cues for breathing, sequencing, weight shift   Gait/Stairs (Locomotion)   [place] Level (Gait) minimum assist " (75% patient effort)  (+chair follow)   Assistive Device (Gait) bariatric equipment;walker, front-wheeled   Distance in Feet (Required for LE Total Joints) 20', 3'   Pattern (Gait) step-through   Deviations/Abnormal Patterns (Gait) base of support, wide;gait speed decreased;stride length decreased   Sensory Examination   Sensory Perception patient reports no sensory changes   Clinical Impression   Criteria for Skilled Therapeutic Intervention yes, treatment indicated   PT Diagnosis (PT) requires assistance to transfer and ambluate   Influenced by the following impairments pain and LE deconditioning   Functional limitations due to impairments cannot perform household mobility and basic self-cares   Clinical Presentation Stable/Uncomplicated   Clinical Presentation Rationale presents as medically diagnosed   Clinical Decision Making (Complexity) moderate complexity   Therapy Frequency (PT) Daily   Predicted Duration of Therapy Intervention (days/wks) 1 week   Planned Therapy Interventions (PT) balance training;bed mobility training;gait training;home exercise program;neuromuscular re-education;patient/family education;postural re-education;stair training;strengthening;transfer training   Anticipated Equipment Needs at Discharge (PT) bariatric equipment;walker, rolling   Risk & Benefits of therapy have been explained evaluation/treatment results reviewed;care plan/treatment goals reviewed;participants voiced agreement with care plan;participants included;patient;spouse/significant other   PT Discharge Planning    PT Discharge Recommendation (DC Rec) Transitional Care Facility   PT Rationale for DC Rec strength and activity tolerance are not sufficient for household mobility and self-cares. Pt is at risk of falls.   PT Brief overview of current status  limited by abd pain   Total Evaluation Time   Total Evaluation Time (Minutes) 10   Yun Dodge DPT 8/2/2021,  [id number]

## 2021-08-02 NOTE — PROGRESS NOTES
ASSESSMENT:  1. Acute cholecystitis    2. Ascending cholangitis        Raza Wilson is a 76 year old male who is s/p laparoscopic cholecystectomy with ERCP on 08/02/21. Gallbladder was chronically inflamed and difficult to remove. No biliary obstruction was noted during ERCP and LFTs are improving post-operatively. Hgb decreased but stable at 11. He remains weak and with the difficulty of the surgery, along with his morbid obesity, we will keep him today and monitor his progression closely.    PLAN:  Monitor drain output  Recheck labs in the AM  Advance diet as tolerated slowly    SUBJECTIVE:   He is felling weak and is having some pain at the drain site with movement. He has not advanced beyond clear liquids yet, but is tolerating clears without difficulty. He denies increased pain with oral intake, nausea and vomiting.      Patient Vitals for the past 24 hrs:   BP Temp Temp src Pulse Resp SpO2   08/02/21 0749 134/82 98.7  F (37.1  C) Tympanic 85 18 95 %   08/02/21 0432 119/58 97.9  F (36.6  C) Oral 76 18 96 %   08/02/21 0100 -- -- -- -- -- 95 %   08/02/21 0000 129/70 98  F (36.7  C) Oral 84 18 97 %   08/01/21 2157 121/63 -- -- 77 16 --   08/01/21 1803 115/56 98.2  F (36.8  C) Oral 80 16 95 %   08/01/21 1730 117/56 98  F (36.7  C) Oral 77 16 95 %   08/01/21 1633 131/60 98.3  F (36.8  C) Oral 71 16 98 %   08/01/21 1554 111/53 -- Oral 75 16 96 %   08/01/21 1526 130/56 98.3  F (36.8  C) Oral 74 16 95 %   08/01/21 1500 (!) 174/74 -- -- 74 14 94 %   08/01/21 1450 (!) 171/73 99  F (37.2  C) Temporal 74 16 96 %   08/01/21 1440 (!) 179/77 -- -- 75 17 96 %   08/01/21 1430 (!) 185/74 -- -- 79 17 95 %   08/01/21 1420 (!) 153/67 99.3  F (37.4  C) Temporal 76 16 96 %   08/01/21 1410 (!) 150/67 -- -- 75 14 93 %   08/01/21 1400 (!) 168/72 -- -- 75 22 (!) 79 %   08/01/21 1350 (!) 164/70 -- -- 74 17 97 %   08/01/21 1340 (!) 149/66 -- -- 72 27 95 %   08/01/21 1330 (!) 140/65 -- -- 79 26 --   08/01/21 1323 (!) 154/74 96.9  F  (36.1  C) Temporal 89 30 --         PHYSICAL EXAM:  GEN: No acute distress, comfortable  LUNGS: CTA bilaterally  CV:RRR  ABD:super morbidly obese; expected ttp; incisions intact with some ecchymosis noted in surrounding tissue  Drains: dark sanguineous fluid without obvious signs of bile leak   Output by Drain (mL) 07/31/21 0700 - 07/31/21 1459 07/31/21 1500 - 07/31/21 2259 07/31/21 2300 - 08/01/21 0659 08/01/21 0700 - 08/01/21 1459 08/01/21 1500 - 08/01/21 2259 08/01/21 2300 - 08/02/21 0659 08/02/21 0700 - 08/02/21 0859   Closed/Suction Drain 1 Right RUQ Bulb 19 Citizen of [place]    30 50 45       EXT:No cyanosis, edema or obvious abnormalities    08/01 0700 - 08/02 0659  In: 1740 [P.O.:240; I.V.:1000]  Out: 2725 [Urine:1600; Drains:125]    Admission on 07/31/2021   Component Date Value     Sodium 07/31/2021 136      Potassium 07/31/2021 3.7      Chloride 07/31/2021 97*     Carbon Dioxide (CO2) 07/31/2021 25      Anion Gap 07/31/2021 14      Urea Nitrogen 07/31/2021 16      Creatinine 07/31/2021 0.98      Calcium 07/31/2021 9.4      Glucose 07/31/2021 164*     Alkaline Phosphatase 07/31/2021 416*     AST 07/31/2021 193*     ALT 07/31/2021 159*     Protein Total 07/31/2021 7.7      Albumin 07/31/2021 3.0*     Bilirubin Total 07/31/2021 3.0*     GFR Estimate 07/31/2021 75      Troponin I 07/31/2021 <0.01      BNP 07/31/2021 22      Lactic Acid 07/31/2021 2.0      INR 07/31/2021 1.13      WBC Count 07/31/2021 16.3*     RBC Count 07/31/2021 4.92      Hemoglobin 07/31/2021 14.7      Hematocrit 07/31/2021 45.6      MCV 07/31/2021 93      MCH 07/31/2021 29.9      MCHC 07/31/2021 32.2      RDW 07/31/2021 13.2      Platelet Count 07/31/2021 259      % Neutrophils 07/31/2021 83      % Lymphocytes 07/31/2021 10      % Monocytes 07/31/2021 6      % Eosinophils 07/31/2021 0      % Basophils 07/31/2021 0      % Immature Granulocytes 07/31/2021 1      NRBCs per 100 WBC 07/31/2021 0      Absolute Neutrophils 07/31/2021 13.5*     Absolute  Lymphocytes 07/31/2021 1.6      Absolute Monocytes 07/31/2021 1.0      Absolute Eosinophils 07/31/2021 0.1      Absolute Basophils 07/31/2021 0.0      Absolute Immature Granul* 07/31/2021 0.1*     Absolute NRBCs 07/31/2021 0.0      ABO/RH(D) 07/31/2021 O POS      Antibody Screen 07/31/2021 Negative      SPECIMEN EXPIRATION DATE 07/31/2021 [id number]      SARS CoV2 PCR 07/31/2021 Negative      ABO/RH(D) 07/31/2021 O POS      SPECIMEN EXPIRATION DATE 07/31/2021 [id number]      Sodium 08/01/2021 138      Potassium 08/01/2021 4.6      Chloride 08/01/2021 101      Carbon Dioxide (CO2) 08/01/2021 31      Anion Gap 08/01/2021 6      Urea Nitrogen 08/01/2021 18      Creatinine 08/01/2021 0.80      Calcium 08/01/2021 8.9      Glucose 08/01/2021 109      Alkaline Phosphatase 08/01/2021 386*     AST 08/01/2021 288*     ALT 08/01/2021 262*     Protein Total 08/01/2021 6.5      Albumin 08/01/2021 2.5*     Bilirubin Total 08/01/2021 3.4*     GFR Estimate 08/01/2021 87      WBC Count 08/01/2021 8.4      RBC Count 08/01/2021 4.25*     Hemoglobin 08/01/2021 12.8*     Hematocrit 08/01/2021 39.8*     MCV 08/01/2021 94      MCH 08/01/2021 30.1      MCHC 08/01/2021 32.2      RDW 08/01/2021 13.0      Platelet Count 08/01/2021 214      ERCP 08/01/2021                      Value:[facility]  [address] [place] [address]  _______________________________________________________________________________  Patient Name: Raza Wilson          Procedure Date: 8/1/2021 12:15 PM  MRN: [id number]                       Account Number: [id number]  YOB: 1945               Admit Type: Inpatient  Age: 76                                Note Status: Finalized  Attending MD: Lalo Wylie MD Instrument Name: ERCP Scope [id number]  _______________________________________________________________________________     Procedure:           ERCP  Indications:         76 y.o. s/p recent ERCP for  choledocholithisis, did not                        undergo a cholecystectomy, now presenting with                        cholecystitis and significantly elevated LFTs. S/p                        complex cholecystectomy today. ERCP to evaluate for a                        retained CBD stone.  Providers:           Lalo Wylie MD                            Referring MD:          Medicines:           General Anesthesia  Complications:       No immediate complications. Estimated blood loss:                        Minimal.  _______________________________________________________________________________  Procedure:           Pre-Anesthesia Assessment:                       - Prior to the procedure, a History and Physical was                        performed, and patient medications and allergies were                        reviewed. The patient is competent. The risks and                        benefits of the procedure and the sedation options and                        risks were discussed with the patient. All questions                        were answered and informed consent was obtained. Patient                        identification and proposed procedure were verified by                        the physician, the nurse and the anesthetist in the                        pre-procedure area in the procedure room. Mental Status                                                  Examination: alert and oriented. Prophylactic                        Antibiotics: The patient does not require prophylactic                        antibiotics. Prior Anticoagulants: The patient has taken                        no previous anticoagulant or antiplatelet agents. ASA                        Grade Assessment: III - A patient with severe systemic                        disease. After reviewing the risks and benefits, the                        patient was deemed in satisfactory condition to undergo                        the  procedure. The anesthesia plan was to use general                        anesthesia. Immediately prior to administration of                        medications, the patient was re-assessed for adequacy to                        receive sedatives. The heart rate, respiratory rate,                        oxygen saturations, blood pressure, adequacy of                        pulmonary ventilation, and response to care were                                                  monitored throughout the procedure. The physical status                        of the patient was re-assessed after the procedure.                       After obtaining informed consent, the scope was passed                        under direct vision. Throughout the procedure, the                        patient's blood pressure, pulse, and oxygen saturations                        were monitored continuously. The ERCP scope was                        introduced through the mouth, and used to inject                        contrast into and used to inject contrast into the bile                        duct. The ERCP was accomplished without difficulty. The                        patient tolerated the procedure well.                                                                                   Findings:       A  film of the abdomen was obtained. Surgical clips, consistent        with a previous cholecystectomy, were seen in the area of the right        upper quadrant of the abdome                          n. The esophagus was successfully intubated        under direct vision without detailed examination of the pharynx, larynx,        and associated structures, and upper GI tract. The upper GI tract was        grossly normal. A biliary sphincterotomy had been performed. The        sphincterotomy appeared open. Deep bile duct cannulation was obtained        with the 11.5 mm balloon. A 0.025 inch x 270 cm straight Visiglide wire        was passed into  the biliary tree. Contrast was injected into the biliary        tree. The lower third of the main bile duct contained filling defect        thought to be a stone vs refluxing air bubble. The biliary        sphincterotomy was extended to a total of 12 mm in length with a        monofilament traction (standard) sphincterotome using ERBE        electrocautery. There was no post-sphincterotomy bleeding. The biliary        tree was swept with an 8.5 mm balloon and 11.5 mm balloon starting at        the bifurcation. Nothing was found                                                                                                             Moderate Sedation:       Moderate sedation not used  Impression:          - No evidence of retained CBD stone. Biliary                        sphincterotomy extended and CBD swept multiple times                        with no stones. Suspect that the LFT elevation is                        secondary to significant cholecystitis.  Recommendation:      - Return patient to hospital [facility] for ongoing care.                       - No anticoagulation of any kind (including DVT                        prophylaxis) for 72 hours. ASA and NSAIDs OK to use.                                                                                     Lalo Wylie MD  ____________________________  Lalo Kenny'abel Wylie MD  8/1/2021 1:11:21 PM  I was physically present for the entire viewing portion of the exam.  __________________________  Signature of teaching physician  B4c/Alba Wylie MD  Number of Addenda: 0    Note Initiated On: 8/ 1/2021 12:15 PM  Scope In: 12:17:06 PM  Scope Out: 12:39:02 PM       Hemoglobin 08/01/2021 12.8*     Color Urine 08/01/2021 Adelina*     Appearance Urine 08/01/2021 Clear      Glucose Urine 08/01/2021 Negative      Bilirubin Urine 08/01/2021 2.0 mg/dL*     Ketones Urine 08/01/2021 10 *     Specific Gravity Urine 08/01/2021 >1.050*     Blood Urine  08/01/2021 0.03 mg/dL*     pH Urine 08/01/2021 6.0      Protein Albumin Urine 08/01/2021 50 *     Urobilinogen Urine 08/01/2021 8.0*     Nitrite Urine 08/01/2021 Negative      Leukocyte Esterase Urine 08/01/2021 Negative      Mucus Urine 08/01/2021 Present*     RBC Urine 08/01/2021 12*     WBC Urine 08/01/2021 1      Hyaline Casts Urine 08/01/2021 3*     Platelet Count 08/01/2021 188      Sodium 08/02/2021 138      Potassium 08/02/2021 4.3      Chloride 08/02/2021 100      Carbon Dioxide (CO2) 08/02/2021 32*     Anion Gap 08/02/2021 6      Urea Nitrogen 08/02/2021 21      Creatinine 08/02/2021 0.81      Calcium 08/02/2021 8.0*     Glucose 08/02/2021 116      Alkaline Phosphatase 08/02/2021 246*     AST 08/02/2021 125*     ALT 08/02/2021 205*     Protein Total 08/02/2021 5.8*     Albumin 08/02/2021 2.4*     Bilirubin Total 08/02/2021 1.0      GFR Estimate 08/02/2021 87      Bilirubin Direct 08/02/2021 0.5      WBC Count 08/02/2021 9.5      RBC Count 08/02/2021 3.66*     Hemoglobin 08/02/2021 11.0*     Hematocrit 08/02/2021 35.0*     MCV 08/02/2021 96      MCH 08/02/2021 30.1      MCHC 08/02/2021 31.4*     RDW 08/02/2021 13.3      Platelet Count 08/02/2021 224      % Neutrophils 08/02/2021 74      % Lymphocytes 08/02/2021 15      % Monocytes 08/02/2021 10      % Eosinophils 08/02/2021 0      % Basophils 08/02/2021 0      % Immature Granulocytes 08/02/2021 1      NRBCs per 100 WBC 08/02/2021 0      Absolute Neutrophils 08/02/2021 7.2      Absolute Lymphocytes 08/02/2021 1.4      Absolute Monocytes 08/02/2021 1.0      Absolute Eosinophils 08/02/2021 0.0      Absolute Basophils 08/02/2021 0.0      Absolute Immature Granul* 08/02/2021 0.1*     Absolute NRBCs 08/02/2021 0.0      GLUCOSE BY METER POCT 08/02/2021 112           Mackenzie Sanderson, APRN CNP

## 2021-08-02 NOTE — PLAN OF CARE
Problem: Adult Inpatient Plan of Care  Goal: Plan of Care Review  Outcome: Improving  Flowsheets (Taken 8/2/2021 1603)  Plan of Care Reviewed With:   patient   spouse  Progress: improving   Incision dressing intact x4, minimal old drainage with some bruising noted. WILLA intact to right side with bloody drainage present. Patient has NOT passed any gas yet and feels bloated-MD Velez is aware. Patient tolerating clears but does not have much of an appetite. Did get up to the chair for most of day shift and walked in the patiño x1. Encouraging incentive spirometer, pt only getting up to 500ml-750ml. Wife present at bedside and updated.     Problem: Pain Acute  Goal: Acceptable Pain Control and Functional Ability  Outcome: Improving  Intervention: Develop Pain Management Plan  Recent Flowsheet Documentation  Taken 8/2/2021 0846 by Palmira Osman RN  Pain Management Interventions:   medication (see MAR)   cold applied   emotional support   Patient minimal if patient is not moving a lot however; patient increased when patient got out of the bed today. PRN toradol given that was effective.    Problem: Urinary Retention  Goal: Effective Urinary Elimination  Outcome: No Change   Cardona came out around 0620 this morning. Patient unable to void on day shift. Patient bladder scan and showed only 65ml. Rosie ordered bolus and continuous fluids. Evening RN will continue to monitor.     Palmira Osman RN 8/2/2021 4:07 PM

## 2021-08-02 NOTE — PROVIDER NOTIFICATION
Pt not able to void after sanders removal. Pt not wanting to drink a lot due to feeling bloated and not passing any flatus yet. Bladder scan only 65 ml, only took in 240 ml on day shift. MD Velez updated.     Rosie responded and ordered 500 ml bolus and then continious fluids.     Palmira Osman RN 8/2/2021

## 2021-08-02 NOTE — PROGRESS NOTES
NOC CPAP order in place for patient; however, patient states he has never used CPAP before, doesn't have sleep apnea.     /70 (BP Location: Left arm)   Pulse 84   Temp 98.2  F (36.8  C) (Oral)   Resp 16   SpO2 97%      Ralf Jefferson, RRT

## 2021-08-03 ENCOUNTER — APPOINTMENT (OUTPATIENT)
Dept: OCCUPATIONAL THERAPY | Facility: HOSPITAL | Age: 76
DRG: 418 | End: 2021-08-03
Payer: MEDICARE

## 2021-08-03 ENCOUNTER — APPOINTMENT (OUTPATIENT)
Dept: PHYSICAL THERAPY | Facility: HOSPITAL | Age: 76
DRG: 418 | End: 2021-08-03
Payer: MEDICARE

## 2021-08-03 LAB
ALBUMIN SERPL-MCNC: 2.4 G/DL (ref 3.5–5)
ALP SERPL-CCNC: 182 U/L (ref 45–120)
ALT SERPL W P-5'-P-CCNC: 135 U/L (ref 0–45)
AST SERPL W P-5'-P-CCNC: 45 U/L (ref 0–40)
BILIRUB DIRECT SERPL-MCNC: 0.4 MG/DL
BILIRUB SERPL-MCNC: 0.7 MG/DL (ref 0–1)
ERYTHROCYTE [DISTWIDTH] IN BLOOD BY AUTOMATED COUNT: 13.5 % (ref 10–15)
GLUCOSE BLDC GLUCOMTR-MCNC: 88 MG/DL (ref 70–125)
HCT VFR BLD AUTO: 32.1 % (ref 40–53)
HGB BLD-MCNC: 10.1 G/DL (ref 13.3–17.7)
MCH RBC QN AUTO: 30 PG (ref 26.5–33)
MCHC RBC AUTO-ENTMCNC: 31.5 G/DL (ref 31.5–36.5)
MCV RBC AUTO: 95 FL (ref 78–100)
PLATELET # BLD AUTO: 189 10E3/UL (ref 150–450)
PROT SERPL-MCNC: 5.7 G/DL (ref 6–8)
RBC # BLD AUTO: 3.37 10E6/UL (ref 4.4–5.9)
WBC # BLD AUTO: 7.7 10E3/UL (ref 4–11)

## 2021-08-03 PROCEDURE — 99207 PR CDG-MDM COMPONENT: MEETS MODERATE - DOWN CODED: CPT | Performed by: FAMILY MEDICINE

## 2021-08-03 PROCEDURE — 250N000011 HC RX IP 250 OP 636: Performed by: FAMILY MEDICINE

## 2021-08-03 PROCEDURE — C9113 INJ PANTOPRAZOLE SODIUM, VIA: HCPCS | Performed by: SURGERY

## 2021-08-03 PROCEDURE — 250N000013 HC RX MED GY IP 250 OP 250 PS 637: Performed by: SURGERY

## 2021-08-03 PROCEDURE — 258N000003 HC RX IP 258 OP 636: Performed by: FAMILY MEDICINE

## 2021-08-03 PROCEDURE — 80076 HEPATIC FUNCTION PANEL: CPT | Performed by: NURSE PRACTITIONER

## 2021-08-03 PROCEDURE — 85027 COMPLETE CBC AUTOMATED: CPT | Performed by: NURSE PRACTITIONER

## 2021-08-03 PROCEDURE — 97530 THERAPEUTIC ACTIVITIES: CPT | Mod: GP

## 2021-08-03 PROCEDURE — 250N000011 HC RX IP 250 OP 636: Performed by: SURGERY

## 2021-08-03 PROCEDURE — 99232 SBSQ HOSP IP/OBS MODERATE 35: CPT | Performed by: FAMILY MEDICINE

## 2021-08-03 PROCEDURE — 99024 POSTOP FOLLOW-UP VISIT: CPT | Performed by: PHYSICIAN ASSISTANT

## 2021-08-03 PROCEDURE — 250N000013 HC RX MED GY IP 250 OP 250 PS 637: Performed by: FAMILY MEDICINE

## 2021-08-03 PROCEDURE — 36415 COLL VENOUS BLD VENIPUNCTURE: CPT | Performed by: NURSE PRACTITIONER

## 2021-08-03 PROCEDURE — 120N000001 HC R&B MED SURG/OB

## 2021-08-03 PROCEDURE — 97535 SELF CARE MNGMENT TRAINING: CPT | Mod: GO

## 2021-08-03 PROCEDURE — 97110 THERAPEUTIC EXERCISES: CPT | Mod: GO

## 2021-08-03 RX ORDER — METOCLOPRAMIDE HYDROCHLORIDE 5 MG/ML
5 INJECTION INTRAMUSCULAR; INTRAVENOUS EVERY 6 HOURS
Status: DISCONTINUED | OUTPATIENT
Start: 2021-08-03 | End: 2021-08-04

## 2021-08-03 RX ORDER — METRONIDAZOLE 500 MG/1
500 TABLET ORAL 3 TIMES DAILY
Status: DISCONTINUED | OUTPATIENT
Start: 2021-08-03 | End: 2021-08-06 | Stop reason: HOSPADM

## 2021-08-03 RX ORDER — LEVOFLOXACIN 750 MG/1
750 TABLET, FILM COATED ORAL EVERY 24 HOURS
Status: DISCONTINUED | OUTPATIENT
Start: 2021-08-03 | End: 2021-08-06 | Stop reason: HOSPADM

## 2021-08-03 RX ORDER — TRAZODONE HYDROCHLORIDE 50 MG/1
50 TABLET, FILM COATED ORAL AT BEDTIME
Status: DISCONTINUED | OUTPATIENT
Start: 2021-08-03 | End: 2021-08-06 | Stop reason: HOSPADM

## 2021-08-03 RX ADMIN — METOCLOPRAMIDE HYDROCHLORIDE 5 MG: 5 INJECTION INTRAMUSCULAR; INTRAVENOUS at 20:10

## 2021-08-03 RX ADMIN — HEPARIN SODIUM 5000 UNITS: 10000 INJECTION, SOLUTION INTRAVENOUS; SUBCUTANEOUS at 14:15

## 2021-08-03 RX ADMIN — TRAZODONE HYDROCHLORIDE 50 MG: 50 TABLET ORAL at 21:20

## 2021-08-03 RX ADMIN — LEVOFLOXACIN 750 MG: 750 TABLET, FILM COATED ORAL at 09:36

## 2021-08-03 RX ADMIN — METRONIDAZOLE 500 MG: 500 INJECTION, SOLUTION INTRAVENOUS at 03:17

## 2021-08-03 RX ADMIN — HYDROCODONE BITARTRATE AND ACETAMINOPHEN 1 TABLET: 5; 325 TABLET ORAL at 09:36

## 2021-08-03 RX ADMIN — HEPARIN SODIUM 5000 UNITS: 10000 INJECTION, SOLUTION INTRAVENOUS; SUBCUTANEOUS at 21:18

## 2021-08-03 RX ADMIN — METRONIDAZOLE 500 MG: 500 TABLET ORAL at 14:14

## 2021-08-03 RX ADMIN — METOPROLOL SUCCINATE 100 MG: 25 TABLET, EXTENDED RELEASE ORAL at 09:37

## 2021-08-03 RX ADMIN — PANTOPRAZOLE SODIUM 40 MG: 40 INJECTION, POWDER, FOR SOLUTION INTRAVENOUS at 09:37

## 2021-08-03 RX ADMIN — SODIUM CHLORIDE: 9 INJECTION, SOLUTION INTRAVENOUS at 08:15

## 2021-08-03 RX ADMIN — TRIAMTERENE AND HYDROCHLOROTHIAZIDE 1 TABLET: 37.5; 25 TABLET ORAL at 09:42

## 2021-08-03 RX ADMIN — METOCLOPRAMIDE HYDROCHLORIDE 5 MG: 5 INJECTION INTRAMUSCULAR; INTRAVENOUS at 09:37

## 2021-08-03 RX ADMIN — METRONIDAZOLE 500 MG: 500 TABLET ORAL at 21:20

## 2021-08-03 RX ADMIN — SODIUM CHLORIDE: 9 INJECTION, SOLUTION INTRAVENOUS at 19:48

## 2021-08-03 RX ADMIN — METOCLOPRAMIDE HYDROCHLORIDE 5 MG: 5 INJECTION INTRAMUSCULAR; INTRAVENOUS at 14:15

## 2021-08-03 RX ADMIN — KETOROLAC TROMETHAMINE 15 MG: 15 INJECTION, SOLUTION INTRAMUSCULAR; INTRAVENOUS at 16:27

## 2021-08-03 NOTE — PLAN OF CARE
Problem: Pain Acute  Goal: Acceptable Pain Control and Functional Ability  Outcome: Improving     Problem: Urinary Retention  Goal: Effective Urinary Elimination  Outcome: Improving     Vitally stable, denies pain at rest, audible bowel sounds, no reported flatus.  Voiding appropriately, needs assist with urinal in bed.  Encouraged activity, declined overnight.

## 2021-08-03 NOTE — PROGRESS NOTES
Assessment & Plan   76-year-old male with Crohn's disease, HTN presents with acute cholecystitis and choledocholithiasis.    Active Problems:    Acute cholecystitis    Choledocholithiasis    Umbilical hernia without obstruction and without gangrene    Liver dysfunction    Crohn's disease (H)    Benign essential hypertension    Normocytic anemia    Anemia due to blood loss, acute       Acute cholecystitis and choledocholithiasis  -Ultrasound abdomen with common bile duct dilation to 10 mm, gallbladder sludge and small stones as well as pericholecystic inflammation.  CT abdomen pelvis also with signs of acute cholecystitis.  Patient with elevated ALT, AST, bilirubin 3.4, alkaline phosphatase on admission.  Leukocytosis of 16.3 but otherwise afebrile without signs of sepsis.  Patient with sphincterotomy earlier in 2021. ERCP without any obstruction identified, cholecystectomy 8/1 fairly complicated with large blood loss.   Leukocytosis resolved, hepatic function continues to improve, bilirubin normal postoperatively.   -Change IV antibiotics to oral Metronidazole 500 mg every 12 hours   And Levofloxacin 750 mg po every 24 hours  -Stop IVF  -Gastroenterology consulted, appreciate recommendations  -General surgery consult, appreciate recommendations    Probabale mild ileus  -Patient with bloating, hypoactive bowel sounds following surgery  -Start reglan IV q 6 hour  -Encourage frequent ambulation  -Simethicone PRN    Umbilical hernia  -NON incarcerated umbilical hernia containing fat seen on CT abdomen and pelvis.  No associated inflammation.    Crohn's disease  -Not in acute flare  -Remicade every 2 months    HTN  -Metoprolol  mg p.o. daily  -Triamterene-HCTZ p.o. daily  -Hold home ASA until after surgery    Normocytic anemia, acute blood loss  -Hemoglobin 11 with MCV of 94.  Patient with 1000 ml blood loss from surgery    Electrolytes: currently within normal limits  Fluids: po  Diet: regular diet  VTE  prophylaxis: SCD boots    COVID19 symptom check 8/3/2021  Fevers/Chills/myalgias: NEGATIVE TO ALL  Sick contacts/COVID19 exposure: NEGATIVE TO ALL  Cough/trouble breathing/SOB/sore throat: NEGATIVE TO ALL  Diarrhea: NEGATIVE       Subjective  Cc: abdominal pain    Patient slept very poorly last night.  Feels very tired and not like he can participate in cares today.  Still with a lot of abdominal pain.  Eating has been ok, no nausea.  Feels very bloated    Review of Systems: History obtained from the patient  General ROS: negative  for  - chills, fever, positive for fatigue  ENT ROS: negative for - headaches, hearing change, nasal congestion, nasal discharge  Hematological and Lymphatic ROS: negative for - bleeding problems, blood clots, bruising  Respiratory ROS: negative for - cough, pleuritic pain, shortness of breath  Cardiovascular ROS: negative for - chest pain, dyspnea on exertion, edema  Gastrointestinal ROS: positive for - abdominal pain, negative for constipation, diarrhea, and nausea/vomiting  Genito-Urinary ROS: negative for -  dysuria, hematuria  Musculoskeletal ROS: negative for - gait disturbance, and muscle pain  Neurological ROS: negative for - behavioral changes, confusion, dizziness, numbness/tingling   Dermatological ROS: negative for pruritus, rash    Objective    Physical Examination:   General appearance -alert, patient appeared uncomfortable and tired and is obese, aler alert and oriented x3  Mental status -alert, oriented to person, place, and time, normal mood, behavior, speech, dress, motor activity, and thought processes  HEENT - sclera anicteric, left eye normal, right eye normal, nares normal and patent, no erythema  Lymphatics - no palpable lymphadenopathy, no hepatosplenomegaly  Respiratory - clear to auscultation, no wheezes, rales or rhonchi, symmetric air entry, no tachypnea, retractions or cyanosis  Cardiac - normal rate, regular rhythm, normal S1, S2, no murmurs, rubs, clicks or  gallops, no JVD  Abdomen -bowel soundsquiet all 4 quadrants, patient with right-sided WILLA drain with moderate amount of serosanguineous fluid in bulb.  Right upper quadrant diffusely TTP.  Neurological -alert, normal speech, no focal findings or movement disorder noted  Musculoskeletal - no joint tenderness, deformity or swelling, full range of motion without pain  Extremities - peripheral pulses normal, no pedal edema, no clubbing or cyanosis  Skin -patient appears mildly diaphoretic    Temp:  [97.9  F (36.6  C)-98.5  F (36.9  C)] 98.2  F (36.8  C)  Pulse:  [60-85] 60  Resp:  [16-20] 16  BP: (106-148)/(60-78) 148/65  SpO2:  [93 %-98 %] 93 %      Intake/Output Summary (Last 24 hours) at 8/1/2021 0847  Last data filed at 8/1/2021 0448  Gross per 24 hour   Intake 900 ml   Output --   Net 900 ml       Results    Lab Results personally reviewed 8/3  Imaging Results personally reviewed 8/1  Discussed with patient  Reviewed old records     Advanced Care Planning  Discharge Planning discussed with patient  Discussed care with patient for 35 minutes with greater than 50% of time spent in counseling and coordination of care.    Patient's wife Carla updated at bedside on 8/3    This note was created using dragon dictation, any spelling and grammatical errors are unintentional.

## 2021-08-03 NOTE — PLAN OF CARE
Problem: Urinary Retention  Goal: Effective Urinary Elimination  Outcome: Improving   Pt had bolus at change of shift.  Pt was able to void, urine was not measurable due to missing the urinal.  Bed change was required.    Problem: Nausea and Vomiting  Goal: Fluid and Electrolyte Balance  Outcome: Improving    Pt complains of abdominal fullness/gas.  Simethicone was ordered and given without results.  Pt has not passed gas but has hypo bowel sounds.

## 2021-08-03 NOTE — PLAN OF CARE
Problem: Risk for Delirium  Goal: Improved Attention and Thought Clarity  Outcome: Improving     Problem: Pain Acute  Goal: Acceptable Pain Control and Functional Ability  Outcome: Improving  Intervention: Develop Pain Management Plan  Recent Flowsheet Documentation  Taken 8/3/2021 1200 by Shanika Vivar RN  Pain Management Interventions:   emotional support   cold applied  Taken 8/3/2021 0936 by Shanika Vivar RN  Pain Management Interventions:   medication (see MAR)   emotional support   cold applied     Problem: Pain Acute  Goal: Acceptable Pain Control and Functional Ability  Outcome: Improving  Intervention: Develop Pain Management Plan  Recent Flowsheet Documentation  Taken 8/3/2021 1200 by Shanika Vivar RN  Pain Management Interventions:   emotional support   cold applied  Taken 8/3/2021 0936 by Shanika Vivar RN  Pain Management Interventions:   medication (see MAR)   emotional support   cold applied   Pt has tolerated surgical pain, using ice packs and one dose of vicodin with relief.   Problem: Pain Acute  Goal: Acceptable Pain Control and Functional Ability  Intervention: Develop Pain Management Plan  Recent Flowsheet Documentation  Taken 8/3/2021 1200 by Shanika Vivar RN  Pain Management Interventions:   emotional support   cold applied  Taken 8/3/2021 0936 by Shanika Vivar RN  Pain Management Interventions:   medication (see MAR)   emotional support   cold applied

## 2021-08-03 NOTE — PROGRESS NOTES
ASSESSMENT:  1. Acute cholecystitis    2. Ascending cholangitis        Raza Wilson is a 76 year old male who is s/p laparoscopic cholecystectomy with ERCP on 08/01/21. Gallbladder was chronically inflamed and difficult to remove. No biliary obstruction was noted during ERCP and LFTs are improving post-operatively.     PLAN:  -ADAT, going slow  -Pain control  -Ambulate and up in chair throughout the day  -Continue WILLA drain  -Slow recovery, hopefully ready for discharge tomorrow    SUBJECTIVE:   He is feeling a little better, pain improving, no n/v, passing lots of gas now, no BM      Patient Vitals for the past 24 hrs:   BP Temp Temp src Pulse Resp SpO2   08/03/21 0730 (!) 148/65 98.2  F (36.8  C) Oral 60 16 93 %   08/03/21 0323 -- -- -- 70 -- 96 %   08/03/21 0003 106/68 98.3  F (36.8  C) Oral 85 18 94 %   08/02/21 1957 136/65 98.2  F (36.8  C) Oral 85 18 96 %   08/02/21 1616 133/78 97.9  F (36.6  C) Oral 72 20 95 %   08/02/21 1550 132/60 98  F (36.7  C) Oral 67 19 95 %   08/02/21 1122 139/66 98.5  F (36.9  C) Oral 78 18 98 %         PHYSICAL EXAM:  GEN: No acute distress, comfortable  LUNGS: CTA bilaterally  CV:RRR  ABD:super morbidly obese; expected ttp; incisions intact with some ecchymosis noted in surrounding tissue  Drains: serosanguinous output without signs of hawa leak  Output by Drain (mL) 08/01/21 0700 - 08/01/21 1459 08/01/21 1500 - 08/01/21 2259 08/01/21 2300 - 08/02/21 0659 08/02/21 0700 - 08/02/21 1459 08/02/21 1500 - 08/02/21 2259 08/02/21 2300 - 08/03/21 0659 08/03/21 0700 - 08/03/21 0919   Closed/Suction Drain 1 Right RUQ Bulb 19 Kisw[place]ili 30 50 45  55 45       EXT:No cyanosis, edema or obvious abnormalities    08/02 0700 - 08/03 0659  In: 2268 [P.O.:1275; I.V.:993]  Out: 850 [Urine:750; Drains:100]    Admission on 07/31/2021   Component Date Value     Sodium 07/31/2021 136      Potassium 07/31/2021 3.7      Chloride 07/31/2021 97*     Carbon Dioxide (CO2) 07/31/2021 25      Anion Gap 07/31/2021 14       Urea Nitrogen 07/31/2021 16      Creatinine 07/31/2021 0.98      Calcium 07/31/2021 9.4      Glucose 07/31/2021 164*     Alkaline Phosphatase 07/31/2021 416*     AST 07/31/2021 193*     ALT 07/31/2021 159*     Protein Total 07/31/2021 7.7      Albumin 07/31/2021 3.0*     Bilirubin Total 07/31/2021 3.0*     GFR Estimate 07/31/2021 75      Troponin I 07/31/2021 <0.01      BNP 07/31/2021 22      Lactic Acid 07/31/2021 2.0      INR 07/31/2021 1.13      WBC Count 07/31/2021 16.3*     RBC Count 07/31/2021 4.92      Hemoglobin 07/31/2021 14.7      Hematocrit 07/31/2021 45.6      MCV 07/31/2021 93      MCH 07/31/2021 29.9      MCHC 07/31/2021 32.2      RDW 07/31/2021 13.2      Platelet Count 07/31/2021 259      % Neutrophils 07/31/2021 83      % Lymphocytes 07/31/2021 10      % Monocytes 07/31/2021 6      % Eosinophils 07/31/2021 0      % Basophils 07/31/2021 0      % Immature Granulocytes 07/31/2021 1      NRBCs per 100 WBC 07/31/2021 0      Absolute Neutrophils 07/31/2021 13.5*     Absolute Lymphocytes 07/31/2021 1.6      Absolute Monocytes 07/31/2021 1.0      Absolute Eosinophils 07/31/2021 0.1      Absolute Basophils 07/31/2021 0.0      Absolute Immature Granul* 07/31/2021 0.1*     Absolute NRBCs 07/31/2021 0.0      ABO/RH(D) 07/31/2021 O POS      Antibody Screen 07/31/2021 Negative      SPECIMEN EXPIRATION DATE 07/31/2021 [id number]      SARS CoV2 PCR 07/31/2021 Negative      ABO/RH(D) 07/31/2021 O POS      SPECIMEN EXPIRATION DATE 07/31/2021 [id number]      Sodium 08/01/2021 138      Potassium 08/01/2021 4.6      Chloride 08/01/2021 101      Carbon Dioxide (CO2) 08/01/2021 31      Anion Gap 08/01/2021 6      Urea Nitrogen 08/01/2021 18      Creatinine 08/01/2021 0.80      Calcium 08/01/2021 8.9      Glucose 08/01/2021 109      Alkaline Phosphatase 08/01/2021 386*     AST 08/01/2021 288*     ALT 08/01/2021 262*     Protein Total 08/01/2021 6.5      Albumin 08/01/2021 2.5*     Bilirubin Total 08/01/2021  3.4*     GFR Estimate 08/01/2021 87      WBC Count 08/01/2021 8.4      RBC Count 08/01/2021 4.25*     Hemoglobin 08/01/2021 12.8*     Hematocrit 08/01/2021 39.8*     MCV 08/01/2021 94      MCH 08/01/2021 30.1      MCHC 08/01/2021 32.2      RDW 08/01/2021 13.0      Platelet Count 08/01/2021 214      ERCP 08/01/2021                      Value:[facility]  [address] [place] [address]  _______________________________________________________________________________  Patient Name: Raza Faulknerfrankichago          Procedure Date: 8/1/2021 12:15 PM  MRN: [id number]                       Account Number: [id number]  YOB: 1945               Admit Type: Inpatient  Age: 76                                Note Status: Finalized  Attending MD: Lalo Wylie MD Instrument Name: ERCP Scope [id number]  _______________________________________________________________________________     Procedure:           ERCP  Indications:         76 y.o. s/p recent ERCP for choledocholithisis, did not                        undergo a cholecystectomy, now presenting with                        cholecystitis and significantly elevated LFTs. S/p                        complex cholecystectomy today. ERCP to evaluate for a                        retained CBD stone.  Providers:           Lalo Wylie MD                            Referring MD:          Medicines:           General Anesthesia  Complications:       No immediate complications. Estimated blood loss:                        Minimal.  _______________________________________________________________________________  Procedure:           Pre-Anesthesia Assessment:                       - Prior to the procedure, a History and Physical was                        performed, and patient medications and allergies were                        reviewed. The patient is competent. The risks and                        benefits of the procedure and the  sedation options and                        risks were discussed with the patient. All questions                        were answered and informed consent was obtained. Patient                        identification and proposed procedure were verified by                        the physician, the nurse and the anesthetist in the                        pre-procedure area in the procedure room. Mental Status                                                  Examination: alert and oriented. Prophylactic                        Antibiotics: The patient does not require prophylactic                        antibiotics. Prior Anticoagulants: The patient has taken                        no previous anticoagulant or antiplatelet agents. ASA                        Grade Assessment: III - A patient with severe systemic                        disease. After reviewing the risks and benefits, the                        patient was deemed in satisfactory condition to undergo                        the procedure. The anesthesia plan was to use general                        anesthesia. Immediately prior to administration of                        medications, the patient was re-assessed for adequacy to                        receive sedatives. The heart rate, respiratory rate,                        oxygen saturations, blood pressure, adequacy of                        pulmonary ventilation, and response to care were                                                  monitored throughout the procedure. The physical status                        of the patient was re-assessed after the procedure.                       After obtaining informed consent, the scope was passed                        under direct vision. Throughout the procedure, the                        patient's blood pressure, pulse, and oxygen saturations                        were monitored continuously. The ERCP scope was                        introduced through the  mouth, and used to inject                        contrast into and used to inject contrast into the bile                        duct. The ERCP was accomplished without difficulty. The                        patient tolerated the procedure well.                                                                                   Findings:       A  film of the abdomen was obtained. Surgical clips, consistent        with a previous cholecystectomy, were seen in the area of the right        upper quadrant of the abdome                          n. The esophagus was successfully intubated        under direct vision without detailed examination of the pharynx, larynx,        and associated structures, and upper GI tract. The upper GI tract was        grossly normal. A biliary sphincterotomy had been performed. The        sphincterotomy appeared open. Deep bile duct cannulation was obtained        with the 11.5 mm balloon. A 0.025 inch x 270 cm straight Visiglide wire        was passed into the biliary tree. Contrast was injected into the biliary        tree. The lower third of the main bile duct contained filling defect        thought to be a stone vs refluxing air bubble. The biliary        sphincterotomy was extended to a total of 12 mm in length with a        monofilament traction (standard) sphincterotome using ERBE        electrocautery. There was no post-sphincterotomy bleeding. The biliary        tree was swept with an 8.5 mm balloon and 11.5 mm balloon starting at        the bifurcation. Nothing was found                                                                                                             Moderate Sedation:       Moderate sedation not used  Impression:          - No evidence of retained CBD stone. Biliary                        sphincterotomy extended and CBD swept multiple times                        with no stones. Suspect that the LFT elevation is                        secondary to  significant cholecystitis.  Recommendation:      - Return patient to hospital [facility] for ongoing care.                       - No anticoagulation of any kind (including DVT                        prophylaxis) for 72 hours. ASA and NSAIDs OK to use.                                                                                     Lalo Wylie MD  ____________________________  Lalo Wylie MD  8/1/2021 1:11:21 PM  I was physically present for the entire viewing portion of the exam.  __________________________  Signature of teaching physician  B4c/Alba Wylie MD  Number of Addenda: 0    Note Initiated On: 8/ 1/2021 12:15 PM  Scope In: 12:17:06 PM  Scope Out: 12:39:02 PM       Hemoglobin 08/01/2021 12.8*     Color Urine 08/01/2021 Adelina*     Appearance Urine 08/01/2021 Clear      Glucose Urine 08/01/2021 Negative      Bilirubin Urine 08/01/2021 2.0 mg/dL*     Ketones Urine 08/01/2021 10 *     Specific Gravity Urine 08/01/2021 >1.050*     Blood Urine 08/01/2021 0.03 mg/dL*     pH Urine 08/01/2021 6.0      Protein Albumin Urine 08/01/2021 50 *     Urobilinogen Urine 08/01/2021 8.0*     Nitrite Urine 08/01/2021 Negative      Leukocyte Esterase Urine 08/01/2021 Negative      Mucus Urine 08/01/2021 Present*     RBC Urine 08/01/2021 12*     WBC Urine 08/01/2021 1      Hyaline Casts Urine 08/01/2021 3*     Platelet Count 08/01/2021 188      Sodium 08/02/2021 138      Potassium 08/02/2021 4.3      Chloride 08/02/2021 100      Carbon Dioxide (CO2) 08/02/2021 32*     Anion Gap 08/02/2021 6      Urea Nitrogen 08/02/2021 21      Creatinine 08/02/2021 0.81      Calcium 08/02/2021 8.0*     Glucose 08/02/2021 116      Alkaline Phosphatase 08/02/2021 246*     AST 08/02/2021 125*     ALT 08/02/2021 205*     Protein Total 08/02/2021 5.8*     Albumin 08/02/2021 2.4*     Bilirubin Total 08/02/2021 1.0      GFR Estimate 08/02/2021 87      Bilirubin Direct 08/02/2021 0.5      WBC Count 08/02/2021 9.5       RBC Count 08/02/2021 3.66*     Hemoglobin 08/02/2021 11.0*     Hematocrit 08/02/2021 35.0*     MCV 08/02/2021 96      MCH 08/02/2021 30.1      MCHC 08/02/2021 31.4*     RDW 08/02/2021 13.3      Platelet Count 08/02/2021 224      % Neutrophils 08/02/2021 74      % Lymphocytes 08/02/2021 15      % Monocytes 08/02/2021 10      % Eosinophils 08/02/2021 0      % Basophils 08/02/2021 0      % Immature Granulocytes 08/02/2021 1      NRBCs per 100 WBC 08/02/2021 0      Absolute Neutrophils 08/02/2021 7.2      Absolute Lymphocytes 08/02/2021 1.4      Absolute Monocytes 08/02/2021 1.0      Absolute Eosinophils 08/02/2021 0.0      Absolute Basophils 08/02/2021 0.0      Absolute Immature Granul* 08/02/2021 0.1*     Absolute NRBCs 08/02/2021 0.0      GLUCOSE BY METER POCT 08/02/2021 112           Ghulam Doherty PA-C

## 2021-08-03 NOTE — PROGRESS NOTES
12:29 PM  ENRRIQUE spoke with Odalys at [facility] -[phone].  Pt will need a bariatric bed due to weight (estimated around 360-380 per pt).  Pt's bed is broken at the moment, unable to get accurate weight.      ERASTO spoke with pharmacy to get last dose of Remicade which was June 23, 2021.  Next dose is due on 8/18/21.  ENRRIQUE spoke with hospitalist who states medicine could likely be put on hold during TCU stay.    TCU is reviewing and will get back to ENRRIQUE regarding bed availability.    Rosalia Aguilar, LICSW  8/3/2021

## 2021-08-04 ENCOUNTER — APPOINTMENT (OUTPATIENT)
Dept: OCCUPATIONAL THERAPY | Facility: HOSPITAL | Age: 76
DRG: 418 | End: 2021-08-04
Payer: MEDICARE

## 2021-08-04 PROBLEM — R05.9 COUGH: Status: ACTIVE | Noted: 2021-08-04

## 2021-08-04 LAB
ALBUMIN SERPL-MCNC: 2.4 G/DL (ref 3.5–5)
ALP SERPL-CCNC: 172 U/L (ref 45–120)
ALT SERPL W P-5'-P-CCNC: 97 U/L (ref 0–45)
AST SERPL W P-5'-P-CCNC: 31 U/L (ref 0–40)
ATRIAL RATE - MUSE: 91 BPM
BILIRUB DIRECT SERPL-MCNC: 0.3 MG/DL
BILIRUB FLD-MCNC: 1.2 MG/DL
BILIRUB SERPL-MCNC: 0.7 MG/DL (ref 0–1)
DIASTOLIC BLOOD PRESSURE - MUSE: 71 MMHG
ERYTHROCYTE [DISTWIDTH] IN BLOOD BY AUTOMATED COUNT: 13.4 % (ref 10–15)
HCT VFR BLD AUTO: 34.4 % (ref 40–53)
HGB BLD-MCNC: 10.9 G/DL (ref 13.3–17.7)
INTERPRETATION ECG - MUSE: NORMAL
MCH RBC QN AUTO: 29.9 PG (ref 26.5–33)
MCHC RBC AUTO-ENTMCNC: 31.7 G/DL (ref 31.5–36.5)
MCV RBC AUTO: 95 FL (ref 78–100)
P AXIS - MUSE: 27 DEGREES
PATH REPORT.COMMENTS IMP SPEC: NORMAL
PATH REPORT.COMMENTS IMP SPEC: NORMAL
PATH REPORT.FINAL DX SPEC: NORMAL
PATH REPORT.GROSS SPEC: NORMAL
PATH REPORT.MICROSCOPIC SPEC OTHER STN: NORMAL
PATH REPORT.RELEVANT HX SPEC: NORMAL
PHOTO IMAGE: NORMAL
PLATELET # BLD AUTO: 215 10E3/UL (ref 150–450)
PLATELET # BLD AUTO: 215 10E3/UL (ref 150–450)
PR INTERVAL - MUSE: 202 MS
PROT SERPL-MCNC: 5.9 G/DL (ref 6–8)
QRS DURATION - MUSE: 92 MS
QT - MUSE: 348 MS
QTC - MUSE: 428 MS
R AXIS - MUSE: -63 DEGREES
RBC # BLD AUTO: 3.64 10E6/UL (ref 4.4–5.9)
SYSTOLIC BLOOD PRESSURE - MUSE: 129 MMHG
T AXIS - MUSE: 19 DEGREES
VENTRICULAR RATE- MUSE: 91 BPM
WBC # BLD AUTO: 6.7 10E3/UL (ref 4–11)

## 2021-08-04 PROCEDURE — 250N000013 HC RX MED GY IP 250 OP 250 PS 637: Performed by: ANESTHESIOLOGY

## 2021-08-04 PROCEDURE — 82040 ASSAY OF SERUM ALBUMIN: CPT | Performed by: FAMILY MEDICINE

## 2021-08-04 PROCEDURE — 99207 PR CDG-MDM COMPONENT: MEETS MODERATE - DOWN CODED: CPT | Performed by: FAMILY MEDICINE

## 2021-08-04 PROCEDURE — 120N000001 HC R&B MED SURG/OB

## 2021-08-04 PROCEDURE — 250N000011 HC RX IP 250 OP 636: Performed by: FAMILY MEDICINE

## 2021-08-04 PROCEDURE — 97535 SELF CARE MNGMENT TRAINING: CPT | Mod: GO

## 2021-08-04 PROCEDURE — 88304 TISSUE EXAM BY PATHOLOGIST: CPT | Mod: 26 | Performed by: PATHOLOGY

## 2021-08-04 PROCEDURE — 85049 AUTOMATED PLATELET COUNT: CPT | Performed by: SURGERY

## 2021-08-04 PROCEDURE — 250N000011 HC RX IP 250 OP 636: Performed by: SURGERY

## 2021-08-04 PROCEDURE — 36415 COLL VENOUS BLD VENIPUNCTURE: CPT | Performed by: SURGERY

## 2021-08-04 PROCEDURE — C9113 INJ PANTOPRAZOLE SODIUM, VIA: HCPCS | Performed by: SURGERY

## 2021-08-04 PROCEDURE — 82247 BILIRUBIN TOTAL: CPT | Performed by: SURGERY

## 2021-08-04 PROCEDURE — 99024 POSTOP FOLLOW-UP VISIT: CPT | Performed by: NURSE PRACTITIONER

## 2021-08-04 PROCEDURE — 250N000013 HC RX MED GY IP 250 OP 250 PS 637: Performed by: SURGERY

## 2021-08-04 PROCEDURE — 99232 SBSQ HOSP IP/OBS MODERATE 35: CPT | Performed by: FAMILY MEDICINE

## 2021-08-04 PROCEDURE — 258N000003 HC RX IP 258 OP 636: Performed by: FAMILY MEDICINE

## 2021-08-04 PROCEDURE — 250N000013 HC RX MED GY IP 250 OP 250 PS 637: Performed by: FAMILY MEDICINE

## 2021-08-04 PROCEDURE — 85027 COMPLETE CBC AUTOMATED: CPT | Performed by: NURSE PRACTITIONER

## 2021-08-04 PROCEDURE — 80076 HEPATIC FUNCTION PANEL: CPT | Performed by: FAMILY MEDICINE

## 2021-08-04 RX ORDER — LORAZEPAM 1 MG/1
1 TABLET ORAL 3 TIMES DAILY PRN
Status: DISCONTINUED | OUTPATIENT
Start: 2021-08-04 | End: 2021-08-06 | Stop reason: HOSPADM

## 2021-08-04 RX ORDER — METOCLOPRAMIDE 10 MG/1
10 TABLET ORAL 4 TIMES DAILY PRN
Status: DISCONTINUED | OUTPATIENT
Start: 2021-08-04 | End: 2021-08-06 | Stop reason: HOSPADM

## 2021-08-04 RX ORDER — ACETAMINOPHEN 500 MG
500 TABLET ORAL EVERY 6 HOURS PRN
DISCHARGE
Start: 2021-08-04

## 2021-08-04 RX ADMIN — LORAZEPAM 1 MG: 1 TABLET ORAL at 14:08

## 2021-08-04 RX ADMIN — LEVOFLOXACIN 750 MG: 750 TABLET, FILM COATED ORAL at 11:36

## 2021-08-04 RX ADMIN — METOPROLOL SUCCINATE 100 MG: 25 TABLET, EXTENDED RELEASE ORAL at 07:48

## 2021-08-04 RX ADMIN — PANTOPRAZOLE SODIUM 40 MG: 40 INJECTION, POWDER, FOR SOLUTION INTRAVENOUS at 07:52

## 2021-08-04 RX ADMIN — HEPARIN SODIUM 5000 UNITS: 10000 INJECTION, SOLUTION INTRAVENOUS; SUBCUTANEOUS at 05:43

## 2021-08-04 RX ADMIN — METOCLOPRAMIDE HYDROCHLORIDE 5 MG: 5 INJECTION INTRAMUSCULAR; INTRAVENOUS at 01:48

## 2021-08-04 RX ADMIN — TRIAMTERENE AND HYDROCHLOROTHIAZIDE 1 TABLET: 37.5; 25 TABLET ORAL at 07:49

## 2021-08-04 RX ADMIN — HYDROCODONE BITARTRATE AND ACETAMINOPHEN 2 TABLET: 5; 325 TABLET ORAL at 07:48

## 2021-08-04 RX ADMIN — OXYCODONE HYDROCHLORIDE 5 MG: 5 TABLET ORAL at 21:04

## 2021-08-04 RX ADMIN — METRONIDAZOLE 500 MG: 500 TABLET ORAL at 14:08

## 2021-08-04 RX ADMIN — OXYCODONE HYDROCHLORIDE 5 MG: 5 TABLET ORAL at 13:16

## 2021-08-04 RX ADMIN — SODIUM CHLORIDE: 9 INJECTION, SOLUTION INTRAVENOUS at 05:37

## 2021-08-04 RX ADMIN — METRONIDAZOLE 500 MG: 500 TABLET ORAL at 21:04

## 2021-08-04 RX ADMIN — HEPARIN SODIUM 5000 UNITS: 10000 INJECTION, SOLUTION INTRAVENOUS; SUBCUTANEOUS at 14:08

## 2021-08-04 RX ADMIN — HEPARIN SODIUM 5000 UNITS: 10000 INJECTION, SOLUTION INTRAVENOUS; SUBCUTANEOUS at 21:51

## 2021-08-04 RX ADMIN — METOCLOPRAMIDE HYDROCHLORIDE 5 MG: 5 INJECTION INTRAMUSCULAR; INTRAVENOUS at 07:48

## 2021-08-04 RX ADMIN — METRONIDAZOLE 500 MG: 500 TABLET ORAL at 07:49

## 2021-08-04 ASSESSMENT — MIFFLIN-ST. JEOR: SCORE: 2367.85

## 2021-08-04 NOTE — PROGRESS NOTES
ASSESSMENT:  1. Acute cholecystitis    2. Ascending cholangitis        Raza Wilson is a 76 year old male who is s/p laparoscopic cholecystectomy with ERCP on 08/02/21. Gallbladder was chronically inflamed and difficult to remove. No biliary obstruction was noted during ERCP and LFTs are improving post-operatively.  Difficult to discern for bile leak and drain given the dark nature of the fluid, which is likely secondary to the breakdown the hemostatic material left in the gallbladder fossa.  Given his tolerance of regular diet and improving LFTs, I have low suspicion for bile leak.  No signs of bleeding, but given drop in hemoglobin 2 days ago would like to make sure this is stable for patient leaves so recheck that this morning.    PLAN:  Add CBC onto blood taken this morning  Continue with regular diet  Up to chair and ambulate as able  If hemoglobin stable, patient is likely ready for TCU from a surgery standpoint    SUBJECTIVE:   He is planing of pain with movement and this is primarily at the drain site.  When he is not moving he has minimal pain.  Is tolerating a regular diet and is not moving very much.  Passing flatus and had a bowel movement.      Patient Vitals for the past 24 hrs:   BP Temp Temp src Pulse Resp SpO2   08/04/21 0730 131/65 99.2  F (37.3  C) Oral 76 18 93 %   08/04/21 0048 114/62 98.5  F (36.9  C) Oral 75 16 95 %   08/03/21 1544 121/59 98.3  F (36.8  C) Oral 77 16 94 %   08/03/21 1134 132/81 98.5  F (36.9  C) Oral 73 16 93 %         PHYSICAL EXAM:  GEN: No acute distress, comfortable  LUNGS: CTA bilaterally  CV:RRR  ABD: Super morbidly obese, tender near drain site, soft umbilical hernia noted, dressing intact and removed  Drains: Dark serosanguineous fluid  Output by Drain (mL) 08/02/21 0700 - 08/02/21 1459 08/02/21 1500 - 08/02/21 2259 08/02/21 2300 - 08/03/21 0659 08/03/21 0700 - 08/03/21 1459 08/03/21 1500 - 08/03/21 2259 08/03/21 2300 - 08/04/21 0659 08/04/21 0700 - 08/04/21 0971    Closed/Suction Drain 1 Right RUQ Bulb 19 [place]  55 45 35 30      EXT:No cyanosis, edema or obvious abnormalities    08/03 0700 - 08/04 0659  In: 2396.33 [P.O.:900; I.V.:1496.33]  Out: 1165 [Urine:1100; Drains:65]    Admission on 07/31/2021   Component Date Value     Sodium 07/31/2021 136      Potassium 07/31/2021 3.7      Chloride 07/31/2021 97*     Carbon Dioxide (CO2) 07/31/2021 25      Anion Gap 07/31/2021 14      Urea Nitrogen 07/31/2021 16      Creatinine 07/31/2021 0.98      Calcium 07/31/2021 9.4      Glucose 07/31/2021 164*     Alkaline Phosphatase 07/31/2021 416*     AST 07/31/2021 193*     ALT 07/31/2021 159*     Protein Total 07/31/2021 7.7      Albumin 07/31/2021 3.0*     Bilirubin Total 07/31/2021 3.0*     GFR Estimate 07/31/2021 75      Troponin I 07/31/2021 <0.01      BNP 07/31/2021 22      Systolic Blood Pressure 07/31/2021 129      Diastolic Blood Pressure 07/31/2021 71      Ventricular Rate 07/31/2021 91      Atrial Rate 07/31/2021 91      SC Interval 07/31/2021 202      QRS Duration 07/31/2021 92      QT 07/31/2021 348      QTc 07/31/2021 428      P Axis 07/31/2021 27      R AXIS 07/31/2021 -63      T Axis 07/31/2021 19      Interpretation ECG 07/31/2021                      Value:Sinus rhythm  Left axis deviation  Low voltage QRS  Inferior infarct , age undetermined  Possible Anterolateral infarct , age undetermined  Abnormal ECG  No previous ECGs available  Confirmed by SEE ED PROVIDER NOTE FOR, ECG INTERPRETATION ([id number]),  ROSEY RAMOS ([id number]) on 8/4/2021 8:44:52 AM       Lactic Acid 07/31/2021 2.0      INR 07/31/2021 1.13      WBC Count 07/31/2021 16.3*     RBC Count 07/31/2021 4.92      Hemoglobin 07/31/2021 14.7      Hematocrit 07/31/2021 45.6      MCV 07/31/2021 93      MCH 07/31/2021 29.9      MCHC 07/31/2021 32.2      RDW 07/31/2021 13.2      Platelet Count 07/31/2021 259      % Neutrophils 07/31/2021 83      % Lymphocytes 07/31/2021 10      % Monocytes 07/31/2021 6      %  Eosinophils 07/31/2021 0      % Basophils 07/31/2021 0      % Immature Granulocytes 07/31/2021 1      NRBCs per 100 WBC 07/31/2021 0      Absolute Neutrophils 07/31/2021 13.5*     Absolute Lymphocytes 07/31/2021 1.6      Absolute Monocytes 07/31/2021 1.0      Absolute Eosinophils 07/31/2021 0.1      Absolute Basophils 07/31/2021 0.0      Absolute Immature Granul* 07/31/2021 0.1*     Absolute NRBCs 07/31/2021 0.0      ABO/RH(D) 07/31/2021 O POS      Antibody Screen 07/31/2021 Negative      SPECIMEN EXPIRATION DATE 07/31/2021 [id number]      SARS CoV2 PCR 07/31/2021 Negative      ABO/RH(D) 07/31/2021 O POS      SPECIMEN EXPIRATION DATE 07/31/2021 [id number]      Sodium 08/01/2021 138      Potassium 08/01/2021 4.6      Chloride 08/01/2021 101      Carbon Dioxide (CO2) 08/01/2021 31      Anion Gap 08/01/2021 6      Urea Nitrogen 08/01/2021 18      Creatinine 08/01/2021 0.80      Calcium 08/01/2021 8.9      Glucose 08/01/2021 109      Alkaline Phosphatase 08/01/2021 386*     AST 08/01/2021 288*     ALT 08/01/2021 262*     Protein Total 08/01/2021 6.5      Albumin 08/01/2021 2.5*     Bilirubin Total 08/01/2021 3.4*     GFR Estimate 08/01/2021 87      WBC Count 08/01/2021 8.4      RBC Count 08/01/2021 4.25*     Hemoglobin 08/01/2021 12.8*     Hematocrit 08/01/2021 39.8*     MCV 08/01/2021 94      MCH 08/01/2021 30.1      MCHC 08/01/2021 32.2      RDW 08/01/2021 13.0      Platelet Count 08/01/2021 214      ERCP 08/01/2021                      Value:[facility]  [address] [place] [address]  _______________________________________________________________________________  Patient Name: Raza Wilson          Procedure Date: 8/1/2021 12:15 PM  MRN: [id number]                       Account Number: [id number]  YOB: 1945               Admit Type: Inpatient  Age: 76                                Note Status: Finalized  Attending MD: Lalo Wylie MD Instrument  Name: ERCP Scope [id number]  _______________________________________________________________________________     Procedure:           ERCP  Indications:         76 y.o. s/p recent ERCP for choledocholithisis, did not                        undergo a cholecystectomy, now presenting with                        cholecystitis and significantly elevated LFTs. S/p                        complex cholecystectomy today. ERCP to evaluate for a                        retained CBD stone.  Providers:           Lalo Wylie MD                            Referring MD:          Medicines:           General Anesthesia  Complications:       No immediate complications. Estimated blood loss:                        Minimal.  _______________________________________________________________________________  Procedure:           Pre-Anesthesia Assessment:                       - Prior to the procedure, a History and Physical was                        performed, and patient medications and allergies were                        reviewed. The patient is competent. The risks and                        benefits of the procedure and the sedation options and                        risks were discussed with the patient. All questions                        were answered and informed consent was obtained. Patient                        identification and proposed procedure were verified by                        the physician, the nurse and the anesthetist in the                        pre-procedure area in the procedure room. Mental Status                                                  Examination: alert and oriented. Prophylactic                        Antibiotics: The patient does not require prophylactic                        antibiotics. Prior Anticoagulants: The patient has taken                        no previous anticoagulant or antiplatelet agents. ASA                        Grade Assessment: III - A patient with severe systemic                         disease. After reviewing the risks and benefits, the                        patient was deemed in satisfactory condition to undergo                        the procedure. The anesthesia plan was to use general                        anesthesia. Immediately prior to administration of                        medications, the patient was re-assessed for adequacy to                        receive sedatives. The heart rate, respiratory rate,                        oxygen saturations, blood pressure, adequacy of                        pulmonary ventilation, and response to care were                                                  monitored throughout the procedure. The physical status                        of the patient was re-assessed after the procedure.                       After obtaining informed consent, the scope was passed                        under direct vision. Throughout the procedure, the                        patient's blood pressure, pulse, and oxygen saturations                        were monitored continuously. The ERCP scope was                        introduced through the mouth, and used to inject                        contrast into and used to inject contrast into the bile                        duct. The ERCP was accomplished without difficulty. The                        patient tolerated the procedure well.                                                                                   Findings:       A  film of the abdomen was obtained. Surgical clips, consistent        with a previous cholecystectomy, were seen in the area of the right        upper quadrant of the abdome                          n. The esophagus was successfully intubated        under direct vision without detailed examination of the pharynx, larynx,        and associated structures, and upper GI tract. The upper GI tract was        grossly normal. A biliary sphincterotomy had been performed.  The        sphincterotomy appeared open. Deep bile duct cannulation was obtained        with the 11.5 mm balloon. A 0.025 inch x 270 cm straight Visiglide wire        was passed into the biliary tree. Contrast was injected into the biliary        tree. The lower third of the main bile duct contained filling defect        thought to be a stone vs refluxing air bubble. The biliary        sphincterotomy was extended to a total of 12 mm in length with a        monofilament traction (standard) sphincterotome using ERBE        electrocautery. There was no post-sphincterotomy bleeding. The biliary        tree was swept with an 8.5 mm balloon and 11.5 mm balloon starting at        the bifurcation. Nothing was found                                                                                                             Moderate Sedation:       Moderate sedation not used  Impression:          - No evidence of retained CBD stone. Biliary                        sphincterotomy extended and CBD swept multiple times                        with no stones. Suspect that the LFT elevation is                        secondary to significant cholecystitis.  Recommendation:      - Return patient to hospital [facility] for ongoing care.                       - No anticoagulation of any kind (including DVT                        prophylaxis) for 72 hours. ASA and NSAIDs OK to use.                                                                                     Lalo Wylie MD  ____________________________  Lalo Kenny'abel Wylie MD  8/1/2021 1:11:21 PM  I was physically present for the entire viewing portion of the exam.  __________________________  Signature of teaching physician  [id number]/[id number] Wylie MD  Number of Addenda: 0    Note Initiated On: 8/ 1/2021 12:15 PM  Scope In: 12:17:06 PM  Scope Out: 12:39:02 PM       Hemoglobin 08/01/2021 12.8*     Color Urine 08/01/2021 Adelina*     Appearance Urine 08/01/2021  Clear      Glucose Urine 08/01/2021 Negative      Bilirubin Urine 08/01/2021 2.0 mg/dL*     Ketones Urine 08/01/2021 10 *     Specific Gravity Urine 08/01/2021 >1.050*     Blood Urine 08/01/2021 0.03 mg/dL*     pH Urine 08/01/2021 6.0      Protein Albumin Urine 08/01/2021 50 *     Urobilinogen Urine 08/01/2021 8.0*     Nitrite Urine 08/01/2021 Negative      Leukocyte Esterase Urine 08/01/2021 Negative      Mucus Urine 08/01/2021 Present*     RBC Urine 08/01/2021 12*     WBC Urine 08/01/2021 1      Hyaline Casts Urine 08/01/2021 3*     Platelet Count 08/01/2021 188      Sodium 08/02/2021 138      Potassium 08/02/2021 4.3      Chloride 08/02/2021 100      Carbon Dioxide (CO2) 08/02/2021 32*     Anion Gap 08/02/2021 6      Urea Nitrogen 08/02/2021 21      Creatinine 08/02/2021 0.81      Calcium 08/02/2021 8.0*     Glucose 08/02/2021 116      Alkaline Phosphatase 08/02/2021 246*     AST 08/02/2021 125*     ALT 08/02/2021 205*     Protein Total 08/02/2021 5.8*     Albumin 08/02/2021 2.4*     Bilirubin Total 08/02/2021 1.0      GFR Estimate 08/02/2021 87      Bilirubin Direct 08/02/2021 0.5      WBC Count 08/02/2021 9.5      RBC Count 08/02/2021 3.66*     Hemoglobin 08/02/2021 11.0*     Hematocrit 08/02/2021 35.0*     MCV 08/02/2021 96      MCH 08/02/2021 30.1      MCHC 08/02/2021 31.4*     RDW 08/02/2021 13.3      Platelet Count 08/02/2021 224      % Neutrophils 08/02/2021 74      % Lymphocytes 08/02/2021 15      % Monocytes 08/02/2021 10      % Eosinophils 08/02/2021 0      % Basophils 08/02/2021 0      % Immature Granulocytes 08/02/2021 1      NRBCs per 100 WBC 08/02/2021 0      Absolute Neutrophils 08/02/2021 7.2      Absolute Lymphocytes 08/02/2021 1.4      Absolute Monocytes 08/02/2021 1.0      Absolute Eosinophils 08/02/2021 0.0      Absolute Basophils 08/02/2021 0.0      Absolute Immature Granul* 08/02/2021 0.1*     Absolute NRBCs 08/02/2021 0.0      GLUCOSE BY METER POCT 08/02/2021 112      WBC Count 08/03/2021  7.7      RBC Count 08/03/2021 3.37*     Hemoglobin 08/03/2021 10.1*     Hematocrit 08/03/2021 32.1*     MCV 08/03/2021 95      MCH 08/03/2021 30.0      MCHC 08/03/2021 31.5      RDW 08/03/2021 13.5      Platelet Count 08/03/2021 189      Bilirubin Total 08/03/2021 0.7      Bilirubin Direct 08/03/2021 0.4      Protein Total 08/03/2021 5.7*     Albumin 08/03/2021 2.4*     Alkaline Phosphatase 08/03/2021 182*     AST 08/03/2021 45*     ALT 08/03/2021 135*     GLUCOSE BY METER POCT 08/03/2021 88      Platelet Count 08/04/2021 215      Bilirubin Total 08/04/2021 0.7      Bilirubin Direct 08/04/2021 0.3      Protein Total 08/04/2021 5.9*     Albumin 08/04/2021 2.4*     Alkaline Phosphatase 08/04/2021 172*     AST 08/04/2021 31      ALT 08/04/2021 97*          Mackenzie Sanderson, APRN CNP

## 2021-08-04 NOTE — PROVIDER NOTIFICATION
HEIDY Mann updated that the nuclear med HIDA scan cannot be done until Friday morning due to not having the injection medication not being available until then. NP noted that they might not need the scan, waiting on WILLA fluid results to come back. Will reevaluate tomorrow.     Palmira Osman RN 8/4/2021 4:04 PM

## 2021-08-04 NOTE — PLAN OF CARE
Problem: Pain Acute  Goal: Acceptable Pain Control and Functional Ability  Outcome: Improving  Intervention: Develop Pain Management Plan  Recent Flowsheet Documentation  Taken 8/3/2021 1700 by Nette Singletary RN  Pain Management Interventions: cold applied  Taken 8/3/2021 1627 by Nette Singletary, RN  Pain Management Interventions: medication (see MAR)   Pt c/o pain with movement. Per pt PT tried to work with him, he initially declined but said he did try to get up to chair but was not able to due to pain.  Encouraged pt to get out of bed to urinate and to sit in chair.  Pt declined.  Offered pain medications and educated him on importance of movement.      Problem: Nausea and Vomiting  Goal: Fluid and Electrolyte Balance  Outcome: Improving    Pt denies nausea and vomiting.  Pt is passing gas and abdominal discomfort resolved.

## 2021-08-04 NOTE — PROGRESS NOTES
1:48 PM  ENRRIQUE spoke with Odalys [phone] with admissions at [facility].  She states pt is accepted to facility pending his weight is not over 400 lbs.  Bedside RN is working on getting actual weight with standing scale at bedside.  CM to update TCU with pt's weight.      ENRRIQUE spoke with wife Carla.  Updated pt was accepted to [facility] TCU when medically ready.  Carla is requesting HE transport at discharge and is agreeable to any potential cost.    Rosalia Aguilar, LICSW  8/4/2021

## 2021-08-04 NOTE — PROGRESS NOTES
Assessment & Plan   76-year-old male with Crohn's disease, HTN presents with acute cholecystitis and choledocholithiasis.    Active Problems:    Acute cholecystitis    Choledocholithiasis    Umbilical hernia without obstruction and without gangrene    Liver dysfunction    Crohn's disease (H)    Benign essential hypertension    Normocytic anemia    Anemia due to blood loss, acute    Cough       Acute cholecystitis and choledocholithiasis  -Ultrasound abdomen with common bile duct dilation to 10 mm, gallbladder sludge and small stones as well as pericholecystic inflammation.  CT abdomen pelvis also with signs of acute cholecystitis.  Patient with elevated ALT, AST, bilirubin 3.4, alkaline phosphatase on admission.  Leukocytosis of 16.3 but otherwise afebrile without signs of sepsis.  Patient with sphincterotomy earlier in 2021. ERCP without any obstruction identified, cholecystectomy 8/1 fairly complicated with large blood loss.   Leukocytosis resolved, hepatic function continues to improve, bilirubin normal postoperatively. Concern for possible bile leak on 8/4 as patient with increasing pain and dark output from WILLA drain  -Metronidazole 500 mg po every 12 hours   -Levofloxacin 750 mg po every 24 hours  -WILLA drain fluid studies for bilirubin  -Gastroenterology consulted, appreciate recommendations  -General surgery consult, appreciate recommendations    Cough  -Productive cough, lungs clear, afebrile, no hypoxia, no current concern for pneumonia.  Patient obese and likely with some atelectasis and mucus  -Incentive spirometry  -Start flutter valve    Probabale mild ileus- resolved  -Patient with bloating, hypoactive bowel sounds following surgery but now passing gas  -Stop scheduled reglan  -Encourage frequent ambulation  -Simethicone PRN    Umbilical hernia  -NON incarcerated umbilical hernia containing fat seen on CT abdomen and pelvis.  No associated inflammation.    Crohn's disease  -Not in acute flare  -Remicade  every 2 months    HTN  -Metoprolol  mg p.o. daily  -Triamterene-HCTZ p.o. daily  -Hold home ASA until ok by surgery    Normocytic anemia, acute blood loss  -Hemoglobin 11 with MCV of 94.  Patient with 1000 ml blood loss from surgery    Electrolytes: currently within normal limits  Fluids: po  Diet: regular diet  VTE prophylaxis: SCD boots    COVID19 symptom check 8/4/2021  Fevers/Chills/myalgias: NEGATIVE TO ALL  Sick contacts/COVID19 exposure: NEGATIVE TO ALL  Cough/trouble breathing/SOB/sore throat: positive to cough  Diarrhea: NEGATIVE       Subjective  Cc: abdominal pain    Patient continues to have a lot of RUQ with any movement. Also states that he could not sleep at all last night and that's making him feel less well.  States was able to tolerate eating without nausea or issues.  Denies diarrhea, chest pain, SOB but does have productive cough    Review of Systems: History obtained from the patient  General ROS: negative  for  - chills, fever, positive for fatigue  ENT ROS: negative for - headaches, hearing change, nasal congestion, nasal discharge  Hematological and Lymphatic ROS: negative for - bleeding problems, blood clots, bruising  Respiratory ROS: positive for - cough, negative for pleuritic pain, shortness of breath  Cardiovascular ROS: negative for - chest pain, dyspnea on exertion, edema  Gastrointestinal ROS: positive for - abdominal pain, negative for constipation, diarrhea, and nausea/vomiting  Genito-Urinary ROS: negative for -  dysuria, hematuria  Musculoskeletal ROS: negative for - gait disturbance, and muscle pain  Neurological ROS: negative for - behavioral changes, confusion, dizziness, numbness/tingling   Dermatological ROS: negative for pruritus, rash    Objective    Physical Examination:   General appearance -alert, patient appears mildly uncomfortable and obese,  alert and oriented x3  Mental status -alert, oriented to person, place, and time, normal mood, behavior, speech, dress,  motor activity, and thought processes  HEENT - sclera anicteric, left eye normal, right eye normal, nares normal and patent, no erythema  Lymphatics - no palpable lymphadenopathy, no hepatosplenomegaly  Respiratory - clear to auscultation, no wheezes, rales or rhonchi, symmetric air entry, no tachypnea, retractions or cyanosis  Cardiac - normal rate, regular rhythm, normal S1, S2, no murmurs, rubs, clicks or gallops, no JVD  Abdomen -bowel soundsquiet all 4 quadrants, patient with right-sided WILLA drain with moderate amount of dark fluid in bulb.  Right upper quadrant diffusely TTP.  Neurological -alert, normal speech, no focal findings or movement disorder noted  Musculoskeletal - no joint tenderness, deformity or swelling, full range of motion without pain  Extremities - peripheral pulses normal, no pedal edema, no clubbing or cyanosis  Skin - normal skin color, no diaphoresis.     Temp:  [98.3  F (36.8  C)-98.5  F (36.9  C)] 98.5  F (36.9  C)  Pulse:  [73-77] 75  Resp:  [16] 16  BP: (114-132)/(59-81) 114/62  SpO2:  [93 %-95 %] 95 %      Intake/Output Summary (Last 24 hours) at 8/1/2021 0847  Last data filed at 8/1/2021 0448  Gross per 24 hour   Intake 900 ml   Output --   Net 900 ml       Results    Lab Results personally reviewed 8/4  Imaging Results personally reviewed 8/1  Discussed with patient  Reviewed old records     Advanced Care Planning  Discharge Planning discussed with patient  Discussed care with patient for 35 minutes with greater than 50% of time spent in counseling and coordination of care.    Patient's wife Carla updated at bedside on 8/4    This note was created using dragon dictation, any spelling and grammatical errors are unintentional.

## 2021-08-04 NOTE — PLAN OF CARE
Problem: Adult Inpatient Plan of Care  Goal: Absence of Hospital-Acquired Illness or Injury  Outcome: Improving  Intervention: Identify and Manage Fall Risk  Recent Flowsheet Documentation  Taken 8/4/2021 0148 by Darin Rodas, RN  Safety Promotion/Fall Prevention:   activity supervised   lighting adjusted   patient and family education   bed alarm on   room organization consistent   room near nurse's station   safety round/check completed  Intervention: Prevent and Manage VTE (Venous Thromboembolism) Risk  Recent Flowsheet Documentation  Taken 8/4/2021 0148 by Darin Rodas RN  VTE Prevention/Management: pneumatic compression device    Pt is having urinary incontinence.  Pt is passing gas and has active bs.  Pt reports pain with movement that quickly resolves when movement stops.  Pt declines pain medication.

## 2021-08-04 NOTE — PLAN OF CARE
Problem: Adult Inpatient Plan of Care  Goal: Plan of Care Review  Outcome: Improving  Flowsheets (Taken 8/4/2021 9124)  Plan of Care Reviewed With:   patient   spouse  Outcome Summary: pain management, anxiety manamgent, Encourage activity as tolerated  Progress: improving     Problem: Adult Inpatient Plan of Care  Goal: Patient-Specific Goal (Individualized)  Outcome: Improving  Flowsheets (Taken 8/4/2021 7227)  Individualized Care Needs: improve mobility with adequte pain managment  Anxieties, Fears or Concerns: generalized anxiety     Problem: Pain Acute  Goal: Acceptable Pain Control and Functional Ability  Outcome: Improving  Intervention: Develop Pain Management Plan  Recent Flowsheet Documentation  Taken 8/4/2021 4512 by Rosalia Merino RN  Pain Management Interventions: medication (see MAR)

## 2021-08-05 ENCOUNTER — APPOINTMENT (OUTPATIENT)
Dept: OCCUPATIONAL THERAPY | Facility: HOSPITAL | Age: 76
DRG: 418 | End: 2021-08-05
Payer: MEDICARE

## 2021-08-05 PROCEDURE — 99239 HOSP IP/OBS DSCHRG MGMT >30: CPT | Performed by: INTERNAL MEDICINE

## 2021-08-05 PROCEDURE — 250N000013 HC RX MED GY IP 250 OP 250 PS 637: Performed by: ANESTHESIOLOGY

## 2021-08-05 PROCEDURE — 97110 THERAPEUTIC EXERCISES: CPT | Mod: GO

## 2021-08-05 PROCEDURE — 999N000156 HC STATISTIC RCP CONSULT EA 30 MIN

## 2021-08-05 PROCEDURE — 120N000001 HC R&B MED SURG/OB

## 2021-08-05 PROCEDURE — 999N000157 HC STATISTIC RCP TIME EA 10 MIN

## 2021-08-05 PROCEDURE — 97535 SELF CARE MNGMENT TRAINING: CPT | Mod: GO

## 2021-08-05 PROCEDURE — 99024 POSTOP FOLLOW-UP VISIT: CPT | Performed by: PHYSICIAN ASSISTANT

## 2021-08-05 PROCEDURE — 250N000013 HC RX MED GY IP 250 OP 250 PS 637: Performed by: FAMILY MEDICINE

## 2021-08-05 PROCEDURE — 94799 UNLISTED PULMONARY SVC/PX: CPT

## 2021-08-05 PROCEDURE — C9113 INJ PANTOPRAZOLE SODIUM, VIA: HCPCS | Performed by: SURGERY

## 2021-08-05 PROCEDURE — 272N000202 HC AEROBIKA WITH MANOMETER

## 2021-08-05 PROCEDURE — 250N000011 HC RX IP 250 OP 636: Performed by: SURGERY

## 2021-08-05 RX ORDER — METRONIDAZOLE 500 MG/1
500 TABLET ORAL 3 TIMES DAILY
DISCHARGE
Start: 2021-08-05 | End: 2021-08-15

## 2021-08-05 RX ORDER — LEVOFLOXACIN 750 MG/1
750 TABLET, FILM COATED ORAL EVERY 24 HOURS
DISCHARGE
Start: 2021-08-06 | End: 2021-08-16

## 2021-08-05 RX ORDER — OXYCODONE HYDROCHLORIDE 5 MG/1
5 TABLET ORAL EVERY 6 HOURS PRN
Qty: 12 TABLET | Refills: 0 | Status: SHIPPED | DISCHARGE
Start: 2021-08-05 | End: 2021-08-08

## 2021-08-05 RX ADMIN — HEPARIN SODIUM 5000 UNITS: 10000 INJECTION, SOLUTION INTRAVENOUS; SUBCUTANEOUS at 05:54

## 2021-08-05 RX ADMIN — OXYCODONE HYDROCHLORIDE 5 MG: 5 TABLET ORAL at 01:08

## 2021-08-05 RX ADMIN — METOPROLOL SUCCINATE 100 MG: 25 TABLET, EXTENDED RELEASE ORAL at 09:20

## 2021-08-05 RX ADMIN — OXYCODONE HYDROCHLORIDE 5 MG: 5 TABLET ORAL at 05:54

## 2021-08-05 RX ADMIN — LEVOFLOXACIN 750 MG: 750 TABLET, FILM COATED ORAL at 09:21

## 2021-08-05 RX ADMIN — HEPARIN SODIUM 5000 UNITS: 10000 INJECTION, SOLUTION INTRAVENOUS; SUBCUTANEOUS at 14:07

## 2021-08-05 RX ADMIN — OXYCODONE HYDROCHLORIDE 5 MG: 5 TABLET ORAL at 22:24

## 2021-08-05 RX ADMIN — PANTOPRAZOLE SODIUM 40 MG: 40 INJECTION, POWDER, FOR SOLUTION INTRAVENOUS at 09:21

## 2021-08-05 RX ADMIN — TRIAMTERENE AND HYDROCHLOROTHIAZIDE 1 TABLET: 37.5; 25 TABLET ORAL at 09:21

## 2021-08-05 RX ADMIN — HEPARIN SODIUM 5000 UNITS: 10000 INJECTION, SOLUTION INTRAVENOUS; SUBCUTANEOUS at 22:25

## 2021-08-05 RX ADMIN — METRONIDAZOLE 500 MG: 500 TABLET ORAL at 09:21

## 2021-08-05 RX ADMIN — METRONIDAZOLE 500 MG: 500 TABLET ORAL at 14:07

## 2021-08-05 RX ADMIN — METRONIDAZOLE 500 MG: 500 TABLET ORAL at 22:24

## 2021-08-05 NOTE — PLAN OF CARE
Problem: Urinary Retention  Goal: Effective Urinary Elimination  Outcome: Improving   Patient has been voiding urine without difficulties during shift, unable to make it to the urinal every time tho.       Problem: Pain Acute  Goal: Acceptable Pain Control and Functional Ability  Outcome: Improving   Patient having pain to the right side of his abdomen mainly where the WILLA drain is located but it has been tolerable this shift. Got 5 mg of oxycodone at bedtime that was effective.       Problem: Adult Inpatient Plan of Care  Goal: Plan of Care Review  Outcome: Improving  Flowsheets (Taken 8/4/2021 2222)  Plan of Care Reviewed With: patient  Progress: improving   Patient passing flatus. Incisions to abdomen are open to air and intact. WILLA to right side has dark red drainage and got out 30 ml during shift. Pt has good appetite and tolerating regular diet. Patient very tired this shift and just had gotten back to bed at start of shift so did not want to sit in the chair for dinner. Repositioning with assist of two, patient able to help shift his weight as well. Will continue to monitor.     Palmira Osman RN 8/4/2021 10:26 PM

## 2021-08-05 NOTE — PLAN OF CARE
Physical Therapy Discharge Summary    Reason for therapy discharge:    Discharged to transitional care facility.    Progress towards therapy goal(s). See goals on Care Plan in [facility] electronic health record for goal details.  Goals partially met.  Barriers to achieving goals:   limited tolerance for therapy and discharge from facility.    Therapy recommendation(s):    Continued therapy is recommended.  Rationale/Recommendations:  deconditioned, unable to perform household mobility or ADLs without assistance.    Yun Dodge, PT

## 2021-08-05 NOTE — PROGRESS NOTES
"ASSESSMENT:  1. Acute cholecystitis    2. Ascending cholangitis        Raza Wilson is a 76 year old male who is s/p laparoscopic cholecystectomy with ERCP on 08/02/21. Gallbladder was chronically inflamed and difficult to remove. No biliary obstruction was noted during ERCP and LFTs are improving post-operatively.      PLAN:  -ADAT  -No sign of bile leak based on drainage and low bili from fluid yesterday  -WILLA drain can be removed prior to discharge  -Ok to discharge to TCU from surgery standpoint, will have surgery orders and recs placed    SUBJECTIVE:   Doing better, getting more sleep, sore, tolerating diet, passing gas and BM      Patient Vitals for the past 24 hrs:   BP Temp Temp src Pulse Resp SpO2 Height Weight   08/05/21 0750 -- -- -- -- -- 95 % -- --   08/05/21 0731 139/64 98.9  F (37.2  C) Oral 74 20 94 % -- --   08/05/21 0020 133/63 98.2  F (36.8  C) Oral 77 20 96 % -- --   08/04/21 1549 107/71 97.9  F (36.6  C) Oral 72 18 96 % -- --   08/04/21 1401 -- -- -- -- -- -- 1.676 m (5' 6\") (!) 169 kg (372 lb 9.6 oz)         PHYSICAL EXAM:  GEN: No acute distress, comfortable  LUNGS: CTA bilaterally  CV:RRR  ABD: Super morbidly obese, tender near drain site, soft umbilical hernia noted  Drains: serosanguinous  Output by Drain (mL) 08/03/21 0700 - 08/03/21 1459 08/03/21 1500 - 08/03/21 2259 08/03/21 2300 - 08/04/21 0659 08/04/21 0700 - 08/04/21 1459 08/04/21 1500 - 08/04/21 2259 08/04/21 2300 - 08/05/21 0659 08/05/21 0700 - 08/05/21 0932   Closed/Suction Drain 1 Right RUQ Bulb 19 Romanian 35 30  10 20 15     EXT:No cyanosis, edema or obvious abnormalities    08/04 0700 - 08/05 0659  In: 720 [P.O.:720]  Out: 1845 [Urine:1800; Drains:45]    Admission on 07/31/2021   Component Date Value     Sodium 07/31/2021 136      Potassium 07/31/2021 3.7      Chloride 07/31/2021 97*     Carbon Dioxide (CO2) 07/31/2021 25      Anion Gap 07/31/2021 14      Urea Nitrogen 07/31/2021 16      Creatinine 07/31/2021 0.98      Calcium " 07/31/2021 9.4      Glucose 07/31/2021 164*     Alkaline Phosphatase 07/31/2021 416*     AST 07/31/2021 193*     ALT 07/31/2021 159*     Protein Total 07/31/2021 7.7      Albumin 07/31/2021 3.0*     Bilirubin Total 07/31/2021 3.0*     GFR Estimate 07/31/2021 75      Troponin I 07/31/2021 <0.01      BNP 07/31/2021 22      Systolic Blood Pressure 07/31/2021 129      Diastolic Blood Pressure 07/31/2021 71      Ventricular Rate 07/31/2021 91      Atrial Rate 07/31/2021 91      MD Interval 07/31/2021 202      QRS Duration 07/31/2021 92      QT 07/31/2021 348      QTc 07/31/2021 428      P Axis 07/31/2021 27      R AXIS 07/31/2021 -63      T Axis 07/31/2021 19      Interpretation ECG 07/31/2021                      Value:Sinus rhythm  Left axis deviation  Low voltage QRS  Inferior infarct , age undetermined  Possible Anterolateral infarct , age undetermined  Abnormal ECG  No previous ECGs available  Confirmed by SEE ED PROVIDER NOTE FOR, ECG INTERPRETATION ([id number]),  ROSEY RAMOS ([id number]) on 8/4/2021 8:44:52 AM       Lactic Acid 07/31/2021 2.0      INR 07/31/2021 1.13      WBC Count 07/31/2021 16.3*     RBC Count 07/31/2021 4.92      Hemoglobin 07/31/2021 14.7      Hematocrit 07/31/2021 45.6      MCV 07/31/2021 93      MCH 07/31/2021 29.9      MCHC 07/31/2021 32.2      RDW 07/31/2021 13.2      Platelet Count 07/31/2021 259      % Neutrophils 07/31/2021 83      % Lymphocytes 07/31/2021 10      % Monocytes 07/31/2021 6      % Eosinophils 07/31/2021 0      % Basophils 07/31/2021 0      % Immature Granulocytes 07/31/2021 1      NRBCs per 100 WBC 07/31/2021 0      Absolute Neutrophils 07/31/2021 13.5*     Absolute Lymphocytes 07/31/2021 1.6      Absolute Monocytes 07/31/2021 1.0      Absolute Eosinophils 07/31/2021 0.1      Absolute Basophils 07/31/2021 0.0      Absolute Immature Granul* 07/31/2021 0.1*     Absolute NRBCs 07/31/2021 0.0      ABO/RH(D) 07/31/2021 O POS      Antibody Screen 07/31/2021 Negative       SPECIMEN EXPIRATION DATE 07/31/2021 [id number]      SARS CoV2 PCR 07/31/2021 Negative      ABO/RH(D) 07/31/2021 O POS      SPECIMEN EXPIRATION DATE 07/31/2021 [id number]      Sodium 08/01/2021 138      Potassium 08/01/2021 4.6      Chloride 08/01/2021 101      Carbon Dioxide (CO2) 08/01/2021 31      Anion Gap 08/01/2021 6      Urea Nitrogen 08/01/2021 18      Creatinine 08/01/2021 0.80      Calcium 08/01/2021 8.9      Glucose 08/01/2021 109      Alkaline Phosphatase 08/01/2021 386*     AST 08/01/2021 288*     ALT 08/01/2021 262*     Protein Total 08/01/2021 6.5      Albumin 08/01/2021 2.5*     Bilirubin Total 08/01/2021 3.4*     GFR Estimate 08/01/2021 87      WBC Count 08/01/2021 8.4      RBC Count 08/01/2021 4.25*     Hemoglobin 08/01/2021 12.8*     Hematocrit 08/01/2021 39.8*     MCV 08/01/2021 94      MCH 08/01/2021 30.1      MCHC 08/01/2021 32.2      RDW 08/01/2021 13.0      Platelet Count 08/01/2021 214      ERCP 08/01/2021                      Value:[facility]  [address] [place] [address]  _______________________________________________________________________________  Patient Name: Raza Wilson          Procedure Date: 8/1/2021 12:15 PM  MRN: [id number]                       Account Number: [id number]  YOB: 1945               Admit Type: Inpatient  Age: 76                                Note Status: Finalized  Attending MD: Lalo Wylie MD Instrument Name: ERCP Scope [id number]  _______________________________________________________________________________     Procedure:           ERCP  Indications:         76 y.o. s/p recent ERCP for choledocholithisis, did not                        undergo a cholecystectomy, now presenting with                        cholecystitis and significantly elevated LFTs. S/p                        complex cholecystectomy today. ERCP to evaluate for a                        retained CBD stone.  Providers:            Lalo Wylie MD                            Referring MD:          Medicines:           General Anesthesia  Complications:       No immediate complications. Estimated blood loss:                        Minimal.  _______________________________________________________________________________  Procedure:           Pre-Anesthesia Assessment:                       - Prior to the procedure, a History and Physical was                        performed, and patient medications and allergies were                        reviewed. The patient is competent. The risks and                        benefits of the procedure and the sedation options and                        risks were discussed with the patient. All questions                        were answered and informed consent was obtained. Patient                        identification and proposed procedure were verified by                        the physician, the nurse and the anesthetist in the                        pre-procedure area in the procedure room. Mental Status                                                  Examination: alert and oriented. Prophylactic                        Antibiotics: The patient does not require prophylactic                        antibiotics. Prior Anticoagulants: The patient has taken                        no previous anticoagulant or antiplatelet agents. ASA                        Grade Assessment: III - A patient with severe systemic                        disease. After reviewing the risks and benefits, the                        patient was deemed in satisfactory condition to undergo                        the procedure. The anesthesia plan was to use general                        anesthesia. Immediately prior to administration of                        medications, the patient was re-assessed for adequacy to                        receive sedatives. The heart rate, respiratory rate,                        oxygen  saturations, blood pressure, adequacy of                        pulmonary ventilation, and response to care were                                                  monitored throughout the procedure. The physical status                        of the patient was re-assessed after the procedure.                       After obtaining informed consent, the scope was passed                        under direct vision. Throughout the procedure, the                        patient's blood pressure, pulse, and oxygen saturations                        were monitored continuously. The ERCP scope was                        introduced through the mouth, and used to inject                        contrast into and used to inject contrast into the bile                        duct. The ERCP was accomplished without difficulty. The                        patient tolerated the procedure well.                                                                                   Findings:       A  film of the abdomen was obtained. Surgical clips, consistent        with a previous cholecystectomy, were seen in the area of the right        upper quadrant of the abdome                          n. The esophagus was successfully intubated        under direct vision without detailed examination of the pharynx, larynx,        and associated structures, and upper GI tract. The upper GI tract was        grossly normal. A biliary sphincterotomy had been performed. The        sphincterotomy appeared open. Deep bile duct cannulation was obtained        with the 11.5 mm balloon. A 0.025 inch x 270 cm straight Visiglide wire        was passed into the biliary tree. Contrast was injected into the biliary        tree. The lower third of the main bile duct contained filling defect        thought to be a stone vs refluxing air bubble. The biliary        sphincterotomy was extended to a total of 12 mm in length with a        monofilament traction  (standard) sphincterotome using ERBE        electrocautery. There was no post-sphincterotomy bleeding. The biliary        tree was swept with an 8.5 mm balloon and 11.5 mm balloon starting at        the bifurcation. Nothing was found                                                                                                             Moderate Sedation:       Moderate sedation not used  Impression:          - No evidence of retained CBD stone. Biliary                        sphincterotomy extended and CBD swept multiple times                        with no stones. Suspect that the LFT elevation is                        secondary to significant cholecystitis.  Recommendation:      - Return patient to hospital [facility] for ongoing care.                       - No anticoagulation of any kind (including DVT                        prophylaxis) for 72 hours. ASA and NSAIDs OK to use.                                                                                     Lalo Wylie MD  ____________________________  Lalo Kenny'abel Wylie MD  8/1/2021 1:11:21 PM  I was physically present for the entire viewing portion of the exam.  __________________________  Signature of teaching physician  B4c/Alba Wylie MD  Number of Addenda: 0    Note Initiated On: 8/ 1/2021 12:15 PM  Scope In: 12:17:06 PM  Scope Out: 12:39:02 PM       Hemoglobin 08/01/2021 12.8*     Color Urine 08/01/2021 Adelina*     Appearance Urine 08/01/2021 Clear      Glucose Urine 08/01/2021 Negative      Bilirubin Urine 08/01/2021 2.0 mg/dL*     Ketones Urine 08/01/2021 10 *     Specific Gravity Urine 08/01/2021 >1.050*     Blood Urine 08/01/2021 0.03 mg/dL*     pH Urine 08/01/2021 6.0      Protein Albumin Urine 08/01/2021 50 *     Urobilinogen Urine 08/01/2021 8.0*     Nitrite Urine 08/01/2021 Negative      Leukocyte Esterase Urine 08/01/2021 Negative      Mucus Urine 08/01/2021 Present*     RBC Urine 08/01/2021 12*     WBC Urine  08/01/2021 1      Hyaline Casts Urine 08/01/2021 3*     Platelet Count 08/01/2021 188      Sodium 08/02/2021 138      Potassium 08/02/2021 4.3      Chloride 08/02/2021 100      Carbon Dioxide (CO2) 08/02/2021 32*     Anion Gap 08/02/2021 6      Urea Nitrogen 08/02/2021 21      Creatinine 08/02/2021 0.81      Calcium 08/02/2021 8.0*     Glucose 08/02/2021 116      Alkaline Phosphatase 08/02/2021 246*     AST 08/02/2021 125*     ALT 08/02/2021 205*     Protein Total 08/02/2021 5.8*     Albumin 08/02/2021 2.4*     Bilirubin Total 08/02/2021 1.0      GFR Estimate 08/02/2021 87      Bilirubin Direct 08/02/2021 0.5      WBC Count 08/02/2021 9.5      RBC Count 08/02/2021 3.66*     Hemoglobin 08/02/2021 11.0*     Hematocrit 08/02/2021 35.0*     MCV 08/02/2021 96      MCH 08/02/2021 30.1      MCHC 08/02/2021 31.4*     RDW 08/02/2021 13.3      Platelet Count 08/02/2021 224      % Neutrophils 08/02/2021 74      % Lymphocytes 08/02/2021 15      % Monocytes 08/02/2021 10      % Eosinophils 08/02/2021 0      % Basophils 08/02/2021 0      % Immature Granulocytes 08/02/2021 1      NRBCs per 100 WBC 08/02/2021 0      Absolute Neutrophils 08/02/2021 7.2      Absolute Lymphocytes 08/02/2021 1.4      Absolute Monocytes 08/02/2021 1.0      Absolute Eosinophils 08/02/2021 0.0      Absolute Basophils 08/02/2021 0.0      Absolute Immature Granul* 08/02/2021 0.1*     Absolute NRBCs 08/02/2021 0.0      GLUCOSE BY METER POCT 08/02/2021 112      WBC Count 08/03/2021 7.7      RBC Count 08/03/2021 3.37*     Hemoglobin 08/03/2021 10.1*     Hematocrit 08/03/2021 32.1*     MCV 08/03/2021 95      MCH 08/03/2021 30.0      MCHC 08/03/2021 31.5      RDW 08/03/2021 13.5      Platelet Count 08/03/2021 189      Bilirubin Total 08/03/2021 0.7      Bilirubin Direct 08/03/2021 0.4      Protein Total 08/03/2021 5.7*     Albumin 08/03/2021 2.4*     Alkaline Phosphatase 08/03/2021 182*     AST 08/03/2021 45*     ALT 08/03/2021 135*     GLUCOSE BY METER POCT  08/03/2021 88      Platelet Count 08/04/2021 215      Bilirubin Total 08/04/2021 0.7      Bilirubin Direct 08/04/2021 0.3      Protein Total 08/04/2021 5.9*     Albumin 08/04/2021 2.4*     Alkaline Phosphatase 08/04/2021 172*     AST 08/04/2021 31      ALT 08/04/2021 97*          Ghulam Doherty PA-C

## 2021-08-05 NOTE — PLAN OF CARE
Problem: Airway Clearance Ineffective (Pulmonary Impairment)  Goal: Effective Airway Clearance  Outcome: Improving     Rosalia Lantigua, RT

## 2021-08-05 NOTE — PLAN OF CARE
Problem: Adult Inpatient Plan of Care  Goal: Plan of Care Review  Outcome: Improving  Flowsheets (Taken 8/5/2021 1444)  Plan of Care Reviewed With:   patient   spouse  Outcome Summary: encourage ativity as tolerated, Donny drain intact- to be dc'd prior to d/c - medically cleared for d/c - awiting bed placement  Progress: improving     Problem: Adult Inpatient Plan of Care  Goal: Readiness for Transition of Care  Outcome: Improving  Flowsheets (Taken 8/5/2021 1444)  Anticipated Changes Related to Illness: inability to care for self  Transportation Anticipated: health plan transportation  Concerns to be Addressed: discharge planning  Barriers to Discharge: bed placement  Intervention: Mutually Develop Transition Plan  Recent Flowsheet Documentation  Taken 8/5/2021 1444 by Rosalia Merino RN  Anticipated Changes Related to Illness: inability to care for self  Transportation Anticipated: health plan transportation  Concerns to be Addressed: discharge planning

## 2021-08-05 NOTE — DISCHARGE INSTRUCTIONS
Follow up: It is our practice to have all patients follow up with us 2-3 weeks after their surgery to ensure they are recovering well.  For straightforward laparoscopic procedures, this can be done either in clinic as a scheduled follow up appointment, or over the phone.  If you would like a scheduled follow up appointment in clinic, please call us at [phone] to schedule an appointment at your convenience.  If you would prefer to follow up with us by phone please let us know so that we may contact you 2-3 weeks following your procedure.        Diet: Regular diet. Patients can have difficulty with constipation following surgery, due in part to the administration of narcotic medications.  If you are suffering with constipation, you should avoid foods such as hard cheeses or red meat.  Foods high in fiber are recommended.      Activity: You should continue to be active at home, including ambulating frequently.  If possible try to limit the amount of time spent in bed.    Restrictions: You have no lifting restrictions post operatively, but may wish to avoid strenuous physical activity for 1-2 weeks.  You should limit your physical activity if it causes you discomfort; however, this should resolve within 1-2 weeks.   Walking does not count as strenuous physical activity.  You are safe to walk up and down stairs.  Following 2 weeks you may resume all normal physical activity.    Wound / drain care:  It is normal to have a small rim of red present around the incisions. This should not, however, extend beyond 1/4 inch from the incision.  If your incisions become increasingly tender, red, or draining, please contact us.       Bathing: You may shower after 48 hours from surgery.  It is ok to get your incisions wet, but avoid rubbing them.  Avoid soaking in bath tubs, or swimming in lakes, pools, or streams for 4 weeks following surgery.

## 2021-08-05 NOTE — PROGRESS NOTES
RCAT Treatment Plan    Patient Score: 5  Patient Acuity: 5    Clinical Indication for Therapy: prevent atelectasis    Therapy Ordered: Incentive Spirometer and Acapella, pt able to do independently    Assessment Summary: Denies shortness of breath.  Respiratory rate and effort normal.  BS decreased bilaterally.  Strong, nonproductive cough.  Getting to chair with therapy.  IS to 1500 mL.  Pt has been using IS and Aerobika independently.  Will continue to do so.  RT to reevaluate as needed.    Rosalia Lantigua, RT  8/5/2021

## 2021-08-05 NOTE — DISCHARGE SUMMARY
[facility] MEDICINE  DISCHARGE SUMMARY     Primary Care Physician: Jeremy Angel  Admission Date: 7/31/2021   Discharge Provider: Miky Rich MD Discharge Date: 8/5/2021   Diet: Cardiac diet   Code Status: Full Code   Activity: Per surgery        Condition at Discharge: Stable     REASON FOR PRESENTATION(See Admission Note for Details)   Abdominal pain    PRINCIPAL & ACTIVE DISCHARGE DIAGNOSES     Active Problems:    Acute cholecystitis    Choledocholithiasis    Umbilical hernia without obstruction and without gangrene    Liver dysfunction    Crohn's disease (H)    Benign essential hypertension    Normocytic anemia    Anemia due to blood loss, acute    Cough      SIGNIFICANT FINDINGS (Imaging, labs):     Results for orders placed or performed during the hospital encounter of 07/31/21   CT Abdomen Pelvis w Contrast    Narrative    EXAM: CT ABDOMEN PELVIS W CONTRAST  LOCATION: [facility]  DATE/TIME: 7/31/2021 1:31 PM    INDICATION: Abdominal pain at umbilical hernia  COMPARISON: CT of the abdomen and pelvis without contrast 12/13/2016  TECHNIQUE: CT scan of the abdomen and pelvis was performed following injection of IV contrast. Multiplanar reformats were obtained. Dose reduction techniques were used.  CONTRAST: Isovue 370    100ml    FINDINGS:   LOWER CHEST: Symmetric bands of subpleural opacity in the dependent lower lobes consistent with positional atelectasis. Gynecomastia.    HEPATOBILIARY: Diffuse gallbladder wall thickening and inflammatory stranding in the pericholecystic fat. Gallbladder mucosal enhancement which extends to the cystic duct. There are no calcified gallstones. Common bile duct is mildly enlarged measuring 9   mm. No choledocholithiasis.    Diffuse fatty infiltration of liver. No liver lesions.    PANCREAS: Normal.    SPLEEN: Normal.    ADRENAL GLANDS: Stable hypoattenuating 21 x 27 mm left adrenal nodule consistent with an  adrenal adenoma. The right adrenal gland is normal.    KIDNEYS/BLADDER: Kidneys are normal in size with symmetric enhancement. No nephrolithiasis or hydronephrosis. Urinary bladder is decompressed.    BOWEL: Small amount of fluid and air in the stomach without distention. Normal caliber small bowel. Colon is largely decompressed. No colonic wall thickening. Distal descending and sigmoid diverticula are present. No pericolonic inflammatory stranding.    LYMPH NODES: Normal.    VASCULATURE: Mild aortoiliac atheromatous calcifications. No aneurysm or critical stenosis.    PELVIC ORGANS: Prostate gland is normal in size. Seminal vesicles are symmetric. No pelvic free fluid.    MUSCULOSKELETAL: Normal there is a fat-containing umbilical hernia. Large patient habitus. No associated edema/inflammation. Fairly diffuse mild to moderate lower thoracic and lumbar degenerative osteophytes and disc space narrowing. No aggressive or   destructive bone lesions are present.      Impression    IMPRESSION:     1.  Acute cholecystitis with marked and diffuse wall thickening, mucosal hyperenhancement extending to the cystic duct and pericholecystic inflammatory stranding. No calcified gallstones.  2.  Dilated common bile duct measuring up to 9 mm. No common duct stones are detected.  3.  Fat-containing umbilical hernia without associated inflammation.  4.  Distal descending and sigmoid diverticulosis but no diverticulitis.   XR Chest Port 1 View    Narrative    EXAM: XR CHEST PORT 1 VIEW  LOCATION: [facility]  DATE/TIME: 7/31/2021 11:42 AM    INDICATION: preop  COMPARISON: 3/30/2016      Impression    IMPRESSION: Heart size is magnified in AP projection with normal vascularity. Shallow inspiration. No focal consolidation, pneumothorax nor pleural effusion.   Abdomen US, limited (RUQ only)    Narrative    EXAM: US ABDOMEN LIMITED  LOCATION: [facility]  DATE/TIME: 7/31/2021 1:54  PM    INDICATION: Transaminitis with Tbili 3.0  COMPARISON: Same day abdomen and pelvis CT  TECHNIQUE: Limited abdominal ultrasound.    FINDINGS:    GALLBLADDER: Layering biliary sludge sludge and/or small stones in the gallbladder lumen. Diffuse gallbladder wall thickening measuring 6 mm. There is pericholecystic inflammation.    BILE DUCTS: Common bile duct is dilated but no intrahepatic ductal dilation. The common duct measures 10 mm. Distal duct was obscured by bowel gas.    LIVER: Normal parenchyma with smooth contour. No focal mass.    RIGHT KIDNEY: No hydronephrosis.    PANCREAS: The pancreas is largely obscured by overlying gas.    No ascites.      Impression    IMPRESSION:    1.  Cholelithiasis/biliary sludge and multiple findings consistent with acute cholecystitis.  2.  Dilated common bile duct measuring up to 10 mm. Distal common bile duct is obscured. No choledocholithiasis within the imaged CBD. Possible concurrent ascending cholangitis.   XR ERCP    Narrative    EXAM: XR ERCP  LOCATION: [facility]  DATE/TIME: 8/1/2021 11:54 AM    INDICATION: choledocholithiasis  COMPARISON: None.  TECHNIQUE: Exam performed by gastroenterologist.    FLUOROSCOPIC TIME: 5.44 minutes  NUMBER OF IMAGES: 9    FINDINGS:    Surgical clips in the right upper quadrant from cholecystectomy. There is a right upper quadrant surgical drain.    BILE DUCTS: Common bile duct was cannulated with wire extending into the left lobe. The common bile duct is distended measuring around 10 mm in diameter No rounded filling defects are present. Subsequent images show balloon sweep of the distal common   bile duct. Final images show a small amount of residual contrast in nondilated intrahepatic ducts and fairly decompressed common bile duct.  PANCREATIC DUCT: Not imaged.      Impression    IMPRESSION:    Dilated common bile duct status post balloon sweep and subsequent decompression.    Radiologist was not present for  the acquisition of these images. Please see separately dictated procedure report for additional details.       PENDING LABS          PROCEDURES ( this hospitalization only)      Procedure(s):  CHOLECYSTECTOMY, LAPAROSCOPIC  ENDOSCOPIC RETROGRADE CHOLANGIOPANCREATOGRAPHY    RECOMMENDATIONS TO OUTPATIENT PROVIDER FOR F/U VISIT     Primary care 1 to 2 weeks    DISPOSITION     Skilled Nursing Facility    SUMMARY OF HOSPITAL COURSE:      Acute cholecystitis and choledocholithiasis  -Ultrasound abdomen with common bile duct dilation to 10 mm, gallbladder sludge and small stones as well as pericholecystic inflammation.  CT abdomen pelvis also with signs of acute cholecystitis.  Patient with elevated ALT, AST, bilirubin 3.4, alkaline phosphatase on admission.  Leukocytosis of 16.3 but otherwise afebrile without signs of sepsis.  Patient with sphincterotomy earlier in 2021. ERCP without any obstruction identified, cholecystectomy 8/1 fairly complicated with large blood loss.   Leukocytosis resolved, hepatic function continues to improve, bilirubin normal postoperatively.   -Metronidazole/levofloxacin for ascending cholangitis  -Gastroenterology consulted, appreciate recommendations  -General surgery consult, appreciate recommendations     Cough  Resolved , likely with some atelectasis   -Incentive spirometry     Probabale mild ileus- resolved  Tolerating p.o. diet     Umbilical hernia  -NON incarcerated umbilical hernia containing fat seen on CT abdomen and pelvis.  No associated inflammation.     Crohn's disease  -Not in acute flare  -Remicade every 2 months     HTN  -Metoprolol  mg p.o. daily  -Triamterene-HCTZ p.o. daily  Resume aspirin     Normocytic anemia, acute blood loss  -Hemoglobin 11 with MCV of 94.  Patient with 1000 ml blood loss from surgery       Discharge Medications with Med changes:        Review of your medicines      START taking      Dose / Directions   levofloxacin 750 MG tablet  Commonly known  as: LEVAQUIN  Indication: Infection Within the Abdomen  Used for: Ascending cholangitis      Dose: 750 mg  Start taking on: August 6, 2021  Take 1 tablet (750 mg) by mouth every 24 hours for 10 days  Refills: 0     metroNIDAZOLE 500 MG tablet  Commonly known as: FLAGYL  Indication: Infection Within the Abdomen  Used for: Ascending cholangitis      Dose: 500 mg  Take 1 tablet (500 mg) by mouth 3 times daily for 10 days  Refills: 0     oxyCODONE 5 MG tablet  Commonly known as: ROXICODONE      Dose: 5 mg  Take 1 tablet (5 mg) by mouth every 6 hours as needed for moderate to severe pain  Quantity: 12 tablet  Refills: 0        CONTINUE these medicines which may have CHANGED, or have new prescriptions. If we are uncertain of the size of tablets/capsules you have at home, strength may be listed as something that might have changed.      Dose / Directions   acetaminophen 500 MG tablet  Commonly known as: TYLENOL  This may have changed: how much to take      Dose: 500 mg  Take 1 tablet (500 mg) by mouth every 6 hours as needed for mild pain  Refills: 0        CONTINUE these medicines which have NOT CHANGED      Dose / Directions   aspirin 81 MG EC tablet  Commonly known as: ASA  Indication: Acute Heart Attack      Dose: 81 mg  [ASPIRIN 81 MG EC TABLET] Take 81 mg by mouth daily.  Refills: 0     cholecalciferol 50 MCG (2000 UT) tablet  Indication: Vitamin D Deficiency      Dose: 2 tablet  [CHOLECALCIFEROL, VITAMIN D3, 50 MCG (2,000 UNIT) TAB] Take 2 tablets by mouth daily.  Refills: 0     inFLIXimab 100 MG injection  Commonly known as: REMICADE  Indication: Crohn's Disease      Dose: 7.5 mg/kg  [INFLIXIMAB (REMICADE) 100 MG INJECTION] 7.5 mg/kg every 2 (two) months. Per GI  Refills: 0     magnesium oxide 400 MG tablet  Commonly known as: MAG-OX  Indication: Disorder with Low Magnesium Levels      Dose: 400 mg  [MAGNESIUM OXIDE (MAG-OX) 400 MG (241.3 MG MAGNESIUM) TABLET] Take 400 mg by mouth daily.  Refills: 0     metoprolol  succinate  MG 24 hr tablet  Commonly known as: TOPROL-XL  Indication: High Blood Pressure Disorder      Dose: 100 mg  [METOPROLOL SUCCINATE (TOPROL-XL) 100 MG 24 HR TABLET] Take 100 mg by mouth daily.  Refills: 0     triamterene-HCTZ 37.5-25 MG capsule  Commonly known as: DYAZIDE  Indication: High Blood Pressure Disorder      Dose: 1 capsule  Take 1 capsule by mouth daily  Refills: 0           Where to get your medicines      Information about where to get these medications is not yet available    Ask your nurse or doctor about these medications    oxyCODONE 5 MG tablet             Rationale for medication changes:      None        Consults   General surgery      Immunizations given this encounter     Most Recent Immunizations   Administered Date(s) Administered     COVID-19,PF,Moderna 04/04/2021           Anticoagulation Information      Recent INR results:   Recent Labs   Lab 07/31/21  1132   INR 1.13     Warfarin doses (if applicable) or name of other anticoagulant: None      Discharge Orders     Discharge Procedure Orders   General info for SNF   Order Comments: Length of Stay Estimate: Short Term Care: Estimated # of Days <30  Condition at Discharge: Improving  Level of care:skilled   Rehabilitation Potential: Excellent  Admission H&P remains valid and up-to-date: Yes  Recent Chemotherapy: N/A  Use Nursing Home Standing Orders: Yes     Mantoux instructions   Order Comments: Give two-step Mantoux (PPD) Per Facility Policy Yes     Follow Up and recommended labs and tests   Order Comments: Follow up with Nursing home physician.  No follow up labs or test are needed.  Follow up with primary care provider in 1-2 wks  weeks.  No follow up labs or test are needed.     Reason for your hospital stay   Order Comments: Cholecystitis     Activity - Up ad malik     Order Specific Question Answer Comments   Is discharge order? Yes      Physical Therapy Adult Consult   Order Comments: Evaluate and treat as clinically  "indicated.    Reason:  deconditioned     Fall precautions     Advance Diet as Tolerated   Order Comments: Follow this diet upon discharge: Orders Placed This Encounter      Regular Diet Adult     Order Specific Question Answer Comments   Is discharge order? Yes      Examination     Vital Signs in last 24 hours:   Temp:  [97.9  F (36.6  C)-98.9  F (37.2  C)] 98.9  F (37.2  C)  Pulse:  [72-77] 74  Resp:  [18-20] 20  BP: (107-139)/(63-71) 139/64  SpO2:  [94 %-96 %] 95 %   /64 (BP Location: Left arm)   Pulse 74   Temp 98.9  F (37.2  C) (Oral)   Resp 20   Ht 1.676 m (5' 6\")   Wt (!) 169 kg (372 lb 9.6 oz)   SpO2 95%   BMI 60.14 kg/m    General appearance: alert, appears stated age and cooperative  Lungs: clear to auscultation bilaterally  Heart: S1-S2  Abdomen: soft, non-tender; bowel sounds normal; no masses,  no organomegaly  Extremities: No edema      Please see EMR for more detailed significant labs, imaging, consultant notes etc.  Total time spent on discharge: 35 minutes    Miky Rich MD   [facility]: Ph:[phone]    CC:Jeremy Angel  "

## 2021-08-05 NOTE — PROGRESS NOTES
8:25 AM - Received call from [facility] Admissions- facility has positive cases of Covid-19, they are not accepting any new admissions at this time.    8:35 AM - Call patient's spouse, Carla, to update on status of [facility].  Spouse provided additional TCU choices of [facility], [facility], and [facility].  Referrals sent.      Received update from RNCM partner assisting with placement that patient was declined by [facility] and [facility] due to weight limit.  Voice message was left at [facility] to return call.    1:20 PM - Patient's spouse at bedside.    RNCM met with patient and spouse to update on TCU status.    Writer reviewed possible choice of facilities that can accommodate patient weight (372 lbs).  Additional referrals sent to [facility], and [facility].    Update to Charge RN, [facility], [facility] Provider, and bedside RN done.    3:00 PM - Received call from [facility] at [facility].  [facility] will accept for admission to TCU tomorrow, 8/6.    Patient and spouse updated and are agreeable with plan.  Transport has been scheduled with [facility] stretcher (per weight requirement) for 12:30 PM on 8/6/2021.    Update to [facility] Provider done.    Leanne Arcos RN, Care Manager  8/5/2021

## 2021-08-05 NOTE — PLAN OF CARE
Patient tolerating a regular diet.    Medicated for pain X 1 this shift.    Patient incontinent at times.

## 2021-08-06 VITALS
HEIGHT: 66 IN | TEMPERATURE: 98.1 F | SYSTOLIC BLOOD PRESSURE: 121 MMHG | BODY MASS INDEX: 50.62 KG/M2 | DIASTOLIC BLOOD PRESSURE: 63 MMHG | RESPIRATION RATE: 18 BRPM | OXYGEN SATURATION: 93 % | WEIGHT: 315 LBS | HEART RATE: 72 BPM

## 2021-08-06 PROCEDURE — 250N000013 HC RX MED GY IP 250 OP 250 PS 637: Performed by: ANESTHESIOLOGY

## 2021-08-06 PROCEDURE — 250N000011 HC RX IP 250 OP 636: Performed by: SURGERY

## 2021-08-06 PROCEDURE — C9113 INJ PANTOPRAZOLE SODIUM, VIA: HCPCS | Performed by: SURGERY

## 2021-08-06 PROCEDURE — 99024 POSTOP FOLLOW-UP VISIT: CPT | Performed by: PHYSICIAN ASSISTANT

## 2021-08-06 PROCEDURE — 250N000013 HC RX MED GY IP 250 OP 250 PS 637: Performed by: FAMILY MEDICINE

## 2021-08-06 RX ADMIN — TRIAMTERENE AND HYDROCHLOROTHIAZIDE 1 TABLET: 37.5; 25 TABLET ORAL at 09:39

## 2021-08-06 RX ADMIN — METOPROLOL SUCCINATE 100 MG: 25 TABLET, EXTENDED RELEASE ORAL at 07:48

## 2021-08-06 RX ADMIN — LEVOFLOXACIN 750 MG: 750 TABLET, FILM COATED ORAL at 09:39

## 2021-08-06 RX ADMIN — OXYCODONE HYDROCHLORIDE 5 MG: 5 TABLET ORAL at 06:40

## 2021-08-06 RX ADMIN — PANTOPRAZOLE SODIUM 40 MG: 40 INJECTION, POWDER, FOR SOLUTION INTRAVENOUS at 07:48

## 2021-08-06 RX ADMIN — HEPARIN SODIUM 5000 UNITS: 10000 INJECTION, SOLUTION INTRAVENOUS; SUBCUTANEOUS at 06:40

## 2021-08-06 RX ADMIN — METRONIDAZOLE 500 MG: 500 TABLET ORAL at 09:39

## 2021-08-06 RX ADMIN — OXYCODONE HYDROCHLORIDE 5 MG: 5 TABLET ORAL at 11:16

## 2021-08-06 NOTE — PROGRESS NOTES
ASSESSMENT:  1. Acute cholecystitis    2. Ascending cholangitis        Raza Wilson is a 76 year old male who is s/p laparoscopic cholecystectomy with ERCP on 08/02/21. Gallbladder was chronically inflamed and difficult to remove. No biliary obstruction was noted during ERCP and LFTs are improving post-operatively.      PLAN:  -ADAT  -No sign of bile leak based on drainage and low bili from fluid yesterday  -WILLA drain can be removed prior to discharge  -Ok to discharge to TCU from surgery standpoint, will have surgery orders and recs placed    SUBJECTIVE:   Doing better, getting more sleep, sore, tolerating diet, passing gas and BM      Patient Vitals for the past 24 hrs:   BP Temp Temp src Pulse Resp SpO2   08/06/21 0736 121/63 98.1  F (36.7  C) Oral 72 18 93 %   08/06/21 0008 129/60 97.6  F (36.4  C) Oral 75 17 95 %   08/05/21 1938 125/58 99.1  F (37.3  C) Oral 74 18 94 %   08/05/21 1557 124/59 99.4  F (37.4  C) Oral 73 18 94 %         PHYSICAL EXAM:  GEN: No acute distress, comfortable  LUNGS: CTA bilaterally  CV:RRR  ABD: Super morbidly obese, tender near drain site, soft umbilical hernia noted  Drains: serosanguinous  Output by Drain (mL) 08/04/21 0700 - 08/04/21 1459 08/04/21 1500 - 08/04/21 2259 08/04/21 2300 - 08/05/21 0659 08/05/21 0700 - 08/05/21 1459 08/05/21 1500 - 08/05/21 2259 08/05/21 2300 - 08/06/21 0659 08/06/21 0700 - 08/06/21 0915   Closed/Suction Drain 1 Right RUQ Bulb 19 Arabic 10 20 15  20 30     EXT:No cyanosis, edema or obvious abnormalities    08/05 0700 - 08/06 0659  In: 480 [P.O.:480]  Out: 1950 [Urine:1900; Drains:50]    Admission on 07/31/2021   Component Date Value     Sodium 07/31/2021 136      Potassium 07/31/2021 3.7      Chloride 07/31/2021 97*     Carbon Dioxide (CO2) 07/31/2021 25      Anion Gap 07/31/2021 14      Urea Nitrogen 07/31/2021 16      Creatinine 07/31/2021 0.98      Calcium 07/31/2021 9.4      Glucose 07/31/2021 164*     Alkaline Phosphatase 07/31/2021 416*     AST  07/31/2021 193*     ALT 07/31/2021 159*     Protein Total 07/31/2021 7.7      Albumin 07/31/2021 3.0*     Bilirubin Total 07/31/2021 3.0*     GFR Estimate 07/31/2021 75      Troponin I 07/31/2021 <0.01      BNP 07/31/2021 22      Systolic Blood Pressure 07/31/2021 129      Diastolic Blood Pressure 07/31/2021 71      Ventricular Rate 07/31/2021 91      Atrial Rate 07/31/2021 91      WV Interval 07/31/2021 202      QRS Duration 07/31/2021 92      QT 07/31/2021 348      QTc 07/31/2021 428      P Axis 07/31/2021 27      R AXIS 07/31/2021 -63      T Axis 07/31/2021 19      Interpretation ECG 07/31/2021                      Value:Sinus rhythm  Left axis deviation  Low voltage QRS  Inferior infarct , age undetermined  Possible Anterolateral infarct , age undetermined  Abnormal ECG  No previous ECGs available  Confirmed by SEE ED PROVIDER NOTE FOR, ECG INTERPRETATION ([id number]),  ROSEY RAMOS ([id number]) on 8/4/2021 8:44:52 AM       Lactic Acid 07/31/2021 2.0      INR 07/31/2021 1.13      WBC Count 07/31/2021 16.3*     RBC Count 07/31/2021 4.92      Hemoglobin 07/31/2021 14.7      Hematocrit 07/31/2021 45.6      MCV 07/31/2021 93      MCH 07/31/2021 29.9      MCHC 07/31/2021 32.2      RDW 07/31/2021 13.2      Platelet Count 07/31/2021 259      % Neutrophils 07/31/2021 83      % Lymphocytes 07/31/2021 10      % Monocytes 07/31/2021 6      % Eosinophils 07/31/2021 0      % Basophils 07/31/2021 0      % Immature Granulocytes 07/31/2021 1      NRBCs per 100 WBC 07/31/2021 0      Absolute Neutrophils 07/31/2021 13.5*     Absolute Lymphocytes 07/31/2021 1.6      Absolute Monocytes 07/31/2021 1.0      Absolute Eosinophils 07/31/2021 0.1      Absolute Basophils 07/31/2021 0.0      Absolute Immature Granul* 07/31/2021 0.1*     Absolute NRBCs 07/31/2021 0.0      ABO/RH(D) 07/31/2021 O POS      Antibody Screen 07/31/2021 Negative      SPECIMEN EXPIRATION DATE 07/31/2021 [id number]      SARS CoV2 PCR 07/31/2021 Negative       ABO/RH(D) 07/31/2021 O POS      SPECIMEN EXPIRATION DATE 07/31/2021 [id number]      Sodium 08/01/2021 138      Potassium 08/01/2021 4.6      Chloride 08/01/2021 101      Carbon Dioxide (CO2) 08/01/2021 31      Anion Gap 08/01/2021 6      Urea Nitrogen 08/01/2021 18      Creatinine 08/01/2021 0.80      Calcium 08/01/2021 8.9      Glucose 08/01/2021 109      Alkaline Phosphatase 08/01/2021 386*     AST 08/01/2021 288*     ALT 08/01/2021 262*     Protein Total 08/01/2021 6.5      Albumin 08/01/2021 2.5*     Bilirubin Total 08/01/2021 3.4*     GFR Estimate 08/01/2021 87      WBC Count 08/01/2021 8.4      RBC Count 08/01/2021 4.25*     Hemoglobin 08/01/2021 12.8*     Hematocrit 08/01/2021 39.8*     MCV 08/01/2021 94      MCH 08/01/2021 30.1      MCHC 08/01/2021 32.2      RDW 08/01/2021 13.0      Platelet Count 08/01/2021 214      ERCP 08/01/2021                      Value:[facility]  [address] [place] [address]  _______________________________________________________________________________  Patient Name: Raza Wilson          Procedure Date: 8/1/2021 12:15 PM  MRN: [id number]                       Account Number: [id number]  YOB: 1945               Admit Type: Inpatient  Age: 76                                Note Status: Finalized  Attending MD: Lalo Wylie MD Instrument Name: ERCP Scope [id number]  _______________________________________________________________________________     Procedure:           ERCP  Indications:         76 y.o. s/p recent ERCP for choledocholithisis, did not                        undergo a cholecystectomy, now presenting with                        cholecystitis and significantly elevated LFTs. S/p                        complex cholecystectomy today. ERCP to evaluate for a                        retained CBD stone.  Providers:           Lalo Wylie MD                            Referring MD:          Medicines:            General Anesthesia  Complications:       No immediate complications. Estimated blood loss:                        Minimal.  _______________________________________________________________________________  Procedure:           Pre-Anesthesia Assessment:                       - Prior to the procedure, a History and Physical was                        performed, and patient medications and allergies were                        reviewed. The patient is competent. The risks and                        benefits of the procedure and the sedation options and                        risks were discussed with the patient. All questions                        were answered and informed consent was obtained. Patient                        identification and proposed procedure were verified by                        the physician, the nurse and the anesthetist in the                        pre-procedure area in the procedure room. Mental Status                                                  Examination: alert and oriented. Prophylactic                        Antibiotics: The patient does not require prophylactic                        antibiotics. Prior Anticoagulants: The patient has taken                        no previous anticoagulant or antiplatelet agents. ASA                        Grade Assessment: III - A patient with severe systemic                        disease. After reviewing the risks and benefits, the                        patient was deemed in satisfactory condition to undergo                        the procedure. The anesthesia plan was to use general                        anesthesia. Immediately prior to administration of                        medications, the patient was re-assessed for adequacy to                        receive sedatives. The heart rate, respiratory rate,                        oxygen saturations, blood pressure, adequacy of                        pulmonary ventilation, and response  to care were                                                  monitored throughout the procedure. The physical status                        of the patient was re-assessed after the procedure.                       After obtaining informed consent, the scope was passed                        under direct vision. Throughout the procedure, the                        patient's blood pressure, pulse, and oxygen saturations                        were monitored continuously. The ERCP scope was                        introduced through the mouth, and used to inject                        contrast into and used to inject contrast into the bile                        duct. The ERCP was accomplished without difficulty. The                        patient tolerated the procedure well.                                                                                   Findings:       A  film of the abdomen was obtained. Surgical clips, consistent        with a previous cholecystectomy, were seen in the area of the right        upper quadrant of the abdome                          n. The esophagus was successfully intubated        under direct vision without detailed examination of the pharynx, larynx,        and associated structures, and upper GI tract. The upper GI tract was        grossly normal. A biliary sphincterotomy had been performed. The        sphincterotomy appeared open. Deep bile duct cannulation was obtained        with the 11.5 mm balloon. A 0.025 inch x 270 cm straight Visiglide wire        was passed into the biliary tree. Contrast was injected into the biliary        tree. The lower third of the main bile duct contained filling defect        thought to be a stone vs refluxing air bubble. The biliary        sphincterotomy was extended to a total of 12 mm in length with a        monofilament traction (standard) sphincterotome using ERBE        electrocautery. There was no post-sphincterotomy bleeding. The  biliary        tree was swept with an 8.5 mm balloon and 11.5 mm balloon starting at        the bifurcation. Nothing was found                                                                                                             Moderate Sedation:       Moderate sedation not used  Impression:          - No evidence of retained CBD stone. Biliary                        sphincterotomy extended and CBD swept multiple times                        with no stones. Suspect that the LFT elevation is                        secondary to significant cholecystitis.  Recommendation:      - Return patient to hospital hay for ongoing care.                       - No anticoagulation of any kind (including DVT                        prophylaxis) for 72 hours. ASA and NSAIDs OK to use.                                                                                     Lalo Wylie MD  ____________________________  Lalo Kenny'abel Wylie MD  8/1/2021 1:11:21 PM  I was physically present for the entire viewing portion of the exam.  __________________________  Signature of teaching physician  B4c/Alba Wylie MD  Number of Addenda: 0    Note Initiated On: 8/ 1/2021 12:15 PM  Scope In: 12:17:06 PM  Scope Out: 12:39:02 PM       Hemoglobin 08/01/2021 12.8*     Color Urine 08/01/2021 Adelina*     Appearance Urine 08/01/2021 Clear      Glucose Urine 08/01/2021 Negative      Bilirubin Urine 08/01/2021 2.0 mg/dL*     Ketones Urine 08/01/2021 10 *     Specific Gravity Urine 08/01/2021 >1.050*     Blood Urine 08/01/2021 0.03 mg/dL*     pH Urine 08/01/2021 6.0      Protein Albumin Urine 08/01/2021 50 *     Urobilinogen Urine 08/01/2021 8.0*     Nitrite Urine 08/01/2021 Negative      Leukocyte Esterase Urine 08/01/2021 Negative      Mucus Urine 08/01/2021 Present*     RBC Urine 08/01/2021 12*     WBC Urine 08/01/2021 1      Hyaline Casts Urine 08/01/2021 3*     Platelet Count 08/01/2021 188      Sodium 08/02/2021  138      Potassium 08/02/2021 4.3      Chloride 08/02/2021 100      Carbon Dioxide (CO2) 08/02/2021 32*     Anion Gap 08/02/2021 6      Urea Nitrogen 08/02/2021 21      Creatinine 08/02/2021 0.81      Calcium 08/02/2021 8.0*     Glucose 08/02/2021 116      Alkaline Phosphatase 08/02/2021 246*     AST 08/02/2021 125*     ALT 08/02/2021 205*     Protein Total 08/02/2021 5.8*     Albumin 08/02/2021 2.4*     Bilirubin Total 08/02/2021 1.0      GFR Estimate 08/02/2021 87      Bilirubin Direct 08/02/2021 0.5      WBC Count 08/02/2021 9.5      RBC Count 08/02/2021 3.66*     Hemoglobin 08/02/2021 11.0*     Hematocrit 08/02/2021 35.0*     MCV 08/02/2021 96      MCH 08/02/2021 30.1      MCHC 08/02/2021 31.4*     RDW 08/02/2021 13.3      Platelet Count 08/02/2021 224      % Neutrophils 08/02/2021 74      % Lymphocytes 08/02/2021 15      % Monocytes 08/02/2021 10      % Eosinophils 08/02/2021 0      % Basophils 08/02/2021 0      % Immature Granulocytes 08/02/2021 1      NRBCs per 100 WBC 08/02/2021 0      Absolute Neutrophils 08/02/2021 7.2      Absolute Lymphocytes 08/02/2021 1.4      Absolute Monocytes 08/02/2021 1.0      Absolute Eosinophils 08/02/2021 0.0      Absolute Basophils 08/02/2021 0.0      Absolute Immature Granul* 08/02/2021 0.1*     Absolute NRBCs 08/02/2021 0.0      GLUCOSE BY METER POCT 08/02/2021 112      WBC Count 08/03/2021 7.7      RBC Count 08/03/2021 3.37*     Hemoglobin 08/03/2021 10.1*     Hematocrit 08/03/2021 32.1*     MCV 08/03/2021 95      MCH 08/03/2021 30.0      MCHC 08/03/2021 31.5      RDW 08/03/2021 13.5      Platelet Count 08/03/2021 189      Bilirubin Total 08/03/2021 0.7      Bilirubin Direct 08/03/2021 0.4      Protein Total 08/03/2021 5.7*     Albumin 08/03/2021 2.4*     Alkaline Phosphatase 08/03/2021 182*     AST 08/03/2021 45*     ALT 08/03/2021 135*     GLUCOSE BY METER POCT 08/03/2021 88      Platelet Count 08/04/2021 215      Bilirubin Total 08/04/2021 0.7      Bilirubin Direct  08/04/2021 0.3      Protein Total 08/04/2021 5.9*     Albumin 08/04/2021 2.4*     Alkaline Phosphatase 08/04/2021 172*     AST 08/04/2021 31      ALT 08/04/2021 97*          Ghulam Doherty PA-C

## 2021-08-06 NOTE — PROGRESS NOTES
No events overnight  Discharge plan for yesterday but no TCU bed is available  Stable vital signs noted  Discharge later today once TCU bed confirmed  No change to home medications on discharge meds   Home

## 2021-08-06 NOTE — PLAN OF CARE
Pt discharged via stretcher to tcu. Accompanied by wife and ambulance drivers. Copies of current orders sent with. Pt states he has all of his personal items.

## 2021-08-06 NOTE — PLAN OF CARE
Problem: Adult Inpatient Plan of Care  Goal: Optimal Comfort and Wellbeing  Outcome: Improving    Pt had oxycodone for pain at end of evening shift.  Offered again at 0330, pt declined.  Oxycodone given @ 0630 for 1/10 pain at rest. Needing some assistance to use urinal . States he is still passing gas.  30 ml of dark red output in WILLA drain, orders to pull out the day of discharge.  Stretcher transport set up @ 1230 to TCU.

## 2021-08-06 NOTE — PLAN OF CARE
Care Management Discharge Note    Discharge Date: 08/06/2021  Expected Time of Departure: 12:30 PM    Discharge Disposition: Transitional Care    Discharge Services: None    Discharge DME: None    Discharge Transportation: agency    Private pay costs discussed: transportation costs    PAS Confirmation Code:  [id number])  Patient/family educated on Medicare website which has current facility and service quality ratings: yes    Education Provided on the Discharge Plan:  yes  Persons Notified of Discharge Plans: patient, spouse, bedside RN, LUCIUS, Charge RN, MD, TCU  Patient/Family in Agreement with the Plan: yes    Handoff Referral Completed: Yes    Additional Information:  Chart reviewed.  Discharge to [facility] planned for today.  Patient to be transported via [employer] stretcher (need for extra personnel/equipment) at 12:30 PM.  Writer met with patient and his spouse at the bedside to provide updates and answer questions.    Call to TCU to confirm patient admission for today.  Left voice message for return call to [phone], if any questions.      Leanne Arcos RN

## 2021-08-06 NOTE — PLAN OF CARE
Occupational Therapy Discharge Summary    Reason for therapy discharge:    Discharged to transitional care facility.    Progress towards therapy goal(s). See goals on Care Plan in [facility] electronic health record for goal details.  Goals partially met.  Barriers to achieving goals:   discharge from facility.    Therapy recommendation(s):    Recommend additional OT at TCU

## 2022-01-01 ENCOUNTER — HOME INFUSION (PRE-WILLOW HOME INFUSION) (OUTPATIENT)
Dept: PHARMACY | Facility: CLINIC | Age: 77
End: 2022-01-01

## 2022-01-01 ENCOUNTER — LAB REQUISITION (OUTPATIENT)
Dept: LAB | Facility: CLINIC | Age: 77
End: 2022-01-01
Payer: MEDICARE

## 2022-01-01 ENCOUNTER — HOME INFUSION (PRE-WILLOW HOME INFUSION) (OUTPATIENT)
Dept: PHARMACY | Facility: CLINIC | Age: 77
End: 2022-01-01
Payer: COMMERCIAL

## 2022-01-01 ENCOUNTER — LAB REQUISITION (OUTPATIENT)
Dept: LAB | Facility: CLINIC | Age: 77
End: 2022-01-01
Payer: COMMERCIAL

## 2022-01-01 ENCOUNTER — HEALTH MAINTENANCE LETTER (OUTPATIENT)
Age: 77
End: 2022-01-01

## 2022-01-01 DIAGNOSIS — I48.20 CHRONIC ATRIAL FIBRILLATION, UNSPECIFIED (H): ICD-10-CM

## 2022-01-01 DIAGNOSIS — I26.99 OTHER PULMONARY EMBOLISM WITHOUT ACUTE COR PULMONALE (H): ICD-10-CM

## 2022-01-01 DIAGNOSIS — R31.9 HEMATURIA, UNSPECIFIED: ICD-10-CM

## 2022-01-01 DIAGNOSIS — A41.9 SEPSIS, UNSPECIFIED ORGANISM (H): ICD-10-CM

## 2022-01-01 LAB
ALBUMIN UR-MCNC: NEGATIVE MG/DL
ANION GAP SERPL CALCULATED.3IONS-SCNC: 12 MMOL/L (ref 5–18)
APPEARANCE UR: CLEAR
BACTERIA #/AREA URNS HPF: ABNORMAL /HPF
BILIRUB UR QL STRIP: NEGATIVE
BUN SERPL-MCNC: 19 MG/DL (ref 8–28)
CALCIUM SERPL-MCNC: 8.6 MG/DL (ref 8.5–10.5)
CHLORIDE BLD-SCNC: 99 MMOL/L (ref 98–107)
CO2 SERPL-SCNC: 29 MMOL/L (ref 22–31)
COLOR UR AUTO: ABNORMAL
CREAT SERPL-MCNC: 0.72 MG/DL (ref 0.7–1.3)
GFR SERPL CREATININE-BSD FRML MDRD: >90 ML/MIN/1.73M2
GLUCOSE BLD-MCNC: 123 MG/DL (ref 70–125)
GLUCOSE UR STRIP-MCNC: NEGATIVE MG/DL
HGB UR QL STRIP: ABNORMAL
HYALINE CASTS: 8 /LPF
INR PPP: 2.17 (ref 0.85–1.15)
INR PPP: 2.19 (ref 0.85–1.15)
INR PPP: 2.51 (ref 0.85–1.15)
INR PPP: 2.52 (ref 0.85–1.15)
INR PPP: 2.6 (ref 0.85–1.15)
INR PPP: 2.67 (ref 0.85–1.15)
INR PPP: 2.9 (ref 0.85–1.15)
INR PPP: 2.94 (ref 0.85–1.15)
INR PPP: 2.95 (ref 0.85–1.15)
INR PPP: 3.3 (ref 0.85–1.15)
INR PPP: 3.46 (ref 0.85–1.15)
INR PPP: 3.62 (ref 0.85–1.15)
INR PPP: 3.69 (ref 0.85–1.15)
INR PPP: 3.84 (ref 0.85–1.15)
INR PPP: 3.84 (ref 0.85–1.15)
INR PPP: 4.06 (ref 0.85–1.15)
INR PPP: 4.84 (ref 0.85–1.15)
INR PPP: 5.13 (ref 0.85–1.15)
INR PPP: 6.56 (ref 0.85–1.15)
KETONES UR STRIP-MCNC: NEGATIVE MG/DL
LEUKOCYTE ESTERASE UR QL STRIP: NEGATIVE
MAGNESIUM SERPL-MCNC: 2.2 MG/DL (ref 1.8–2.6)
MUCOUS THREADS #/AREA URNS LPF: PRESENT /LPF
NITRATE UR QL: NEGATIVE
PH UR STRIP: 5.5 [PH] (ref 5–7)
POTASSIUM BLD-SCNC: 4.1 MMOL/L (ref 3.5–5)
RBC URINE: 29 /HPF
SODIUM SERPL-SCNC: 140 MMOL/L (ref 136–145)
SP GR UR STRIP: 1.01 (ref 1–1.03)
SQUAMOUS EPITHELIAL: 1 /HPF
UROBILINOGEN UR STRIP-MCNC: <2 MG/DL
WBC URINE: 1 /HPF

## 2022-01-01 PROCEDURE — 81001 URINALYSIS AUTO W/SCOPE: CPT | Mod: ORL | Performed by: FAMILY MEDICINE

## 2022-01-01 PROCEDURE — 36415 COLL VENOUS BLD VENIPUNCTURE: CPT | Mod: ORL | Performed by: FAMILY MEDICINE

## 2022-01-01 PROCEDURE — 85610 PROTHROMBIN TIME: CPT | Mod: ORL | Performed by: FAMILY MEDICINE

## 2022-01-01 PROCEDURE — P9604 ONE-WAY ALLOW PRORATED TRIP: HCPCS | Mod: ORL | Performed by: PHYSICIAN ASSISTANT

## 2022-01-01 PROCEDURE — 36415 COLL VENOUS BLD VENIPUNCTURE: CPT | Mod: ORL | Performed by: PHYSICIAN ASSISTANT

## 2022-01-01 PROCEDURE — 85610 PROTHROMBIN TIME: CPT | Mod: ORL | Performed by: PHYSICIAN ASSISTANT

## 2022-01-01 PROCEDURE — P9603 ONE-WAY ALLOW PRORATED MILES: HCPCS | Mod: ORL | Performed by: FAMILY MEDICINE

## 2022-01-01 PROCEDURE — 83735 ASSAY OF MAGNESIUM: CPT | Mod: ORL | Performed by: FAMILY MEDICINE

## 2022-01-01 PROCEDURE — P9604 ONE-WAY ALLOW PRORATED TRIP: HCPCS | Mod: ORL | Performed by: FAMILY MEDICINE

## 2022-01-01 PROCEDURE — 80048 BASIC METABOLIC PNL TOTAL CA: CPT | Mod: ORL | Performed by: FAMILY MEDICINE

## 2022-01-27 NOTE — PROGRESS NOTES
This is a recent snapshot of the patient's [facility] medical record.  For current drug dose and complete information and questions, call [phone] or In Basket pool, fv home infusion ([id number])  CSN Number:  [id number]

## 2022-02-24 NOTE — PROGRESS NOTES
This is a recent snapshot of the patient's [facility] medical record.  For current drug dose and complete information and questions, call [phone] or In Basket pool, fv home infusion ([id number])  CSN Number:  [id number]

## 2022-03-09 NOTE — PROGRESS NOTES
This is a recent snapshot of the patient's [facility] medical record.  For current drug dose and complete information and questions, call [phone] or In Basket pool, fv home infusion ([id number])  CSN Number:  [id number]

## 2022-04-11 NOTE — PROGRESS NOTES
This is a recent snapshot of the patient's [facility] medical record.  For current drug dose and complete information and questions, call [phone] or In Basket pool, fv home infusion ([id number])  CSN Number:  [id number]

## 2022-04-28 NOTE — PROGRESS NOTES
This is a recent snapshot of the patient's [facility] medical record.  For current drug dose and complete information and questions, call [phone] or In [facility] pool, fv home infusion ([id number])  CSN Number:  [id number]

## 2022-04-28 NOTE — PROGRESS NOTES
This is a recent snapshot of the patient's [facility] medical record.  For current drug dose and complete information and questions, call [phone] or In Basket pool, fv home infusion ([id number])  CSN Number:  [id number]

## 2022-05-05 NOTE — PROGRESS NOTES
This is a recent snapshot of the patient's [facility] medical record.  For current drug dose and complete information and questions, call [phone] or In Basket pool, fv home infusion ([id number])  CSN Number:  [id number]

## 2022-05-25 NOTE — PROGRESS NOTES
This is a recent snapshot of the patient's [facility] medical record.  For current drug dose and complete information and questions, call [phone] or In Basket pool, fv home infusion ([id number])  CSN Number:  [id number]

## 2022-05-26 NOTE — PROGRESS NOTES
This is a recent snapshot of the patient's [facility] medical record.  For current drug dose and complete information and questions, call [phone] or In Basket pool, fv home infusion ([id number])  CSN Number:  [id number]

## 2022-06-17 NOTE — PROGRESS NOTES
This is a recent snapshot of the patient's [facility] medical record.  For current drug dose and complete information and questions, call [phone] or In Basket pool, fv home infusion ([id number])  CSN Number:  [id number]

## 2022-06-24 NOTE — PROGRESS NOTES
This is a recent snapshot of the patient's [facility] medical record.  For current drug dose and complete information and questions, call [phone] or In Basket pool, fv home infusion ([id number])  CSN Number:  [id number]

## 2022-08-31 NOTE — PROGRESS NOTES
This is a recent snapshot of the patient's [facility] medical record.  For current drug dose and complete information and questions, call [phone] or In Basket pool, fv home infusion ([id number])  CSN Number:  [id number]

## 2022-10-11 NOTE — PROGRESS NOTES
This is a recent snapshot of the patient's [facility] medical record.  For current drug dose and complete information and questions, call [phone] or In Basket pool, fv home infusion ([id number])  CSN Number:  [id number]

## 2023-02-21 NOTE — PROGRESS NOTES
This is a recent snapshot of the patient's [facility] medical record.  For current drug dose and complete information and questions, call [phone] or In Basket pool, fv home infusion ([id number])  CSN Number:  [id number]

## 2023-04-26 NOTE — PROGRESS NOTES
This is a recent snapshot of the patient's [facility] medical record.  For current drug dose and complete information and questions, call [phone] or In Basket pool, fv home infusion ([id number])  CSN Number:  [id number]

## 2023-07-07 NOTE — PROGRESS NOTES
This is a recent snapshot of the patient's [facility] medical record.  For current drug dose and complete information and questions, call [phone] or In Basket pool, fv home infusion ([id number])  CSN Number:  [id number]

## 2023-11-07 NOTE — PROGRESS NOTES
This is a recent snapshot of the patient's [facility] medical record.  For current drug dose and complete information and questions, call [phone] or In Basket pool, fv home infusion ([id number])  CSN Number:  [id number]

## 2024-10-07 NOTE — ANESTHESIA CARE TRANSFER NOTE
Last vitals:   Vitals:    01/15/21 0840   BP: 153/67   Pulse: 62   Resp: 16   Temp: 35.9  C (96.7  F)   SpO2: 100%     Patient's level of consciousness is drowsy  Spontaneous respirations: yes  Maintains airway independently: yes  Dentition unchanged: yes  Oropharynx: oropharynx clear of all foreign objects    QCDR Measures:  ASA# 20 - Surgical Safety Checklist: WHO surgical safety checklist completed prior to induction    PQRS# 430 - Adult PONV Prevention: 4558F - Pt received => 2 anti-emetic agents (different classes) preop & intraop  ASA# 8 - Peds PONV Prevention: NA - Not pediatric patient, not GA or 2 or more risk factors NOT present  PQRS# 424 - Miriam-op Temp Management: 4559F - At least one body temp DOCUMENTED => 35.5C or 95.9F within required timeframe  PQRS# 426 - PACU Transfer Protocol: - Transfer of care checklist used  ASA# 14 - Acute Post-op Pain: ASA14B - Patient did NOT experience pain >= 7 out of 10   [Initial Evaluation] : an initial evaluation

## (undated) DEVICE — ENDO TROCAR SLEEVE KII Z-THREADED 05X100MM CTS02

## (undated) DEVICE — ENDO TROCAR SHIELDED BLADED KII Z-THRD 11X100MM CTB33

## (undated) DEVICE — DECANTER VIAL 2006S

## (undated) DEVICE — DRAIN BLAKE 19FR SIL 2231

## (undated) DEVICE — Device

## (undated) DEVICE — SOL RINGERS LACTATED 1000ML BAG 2B2324X

## (undated) DEVICE — DRAIN RESERVOIR 100ML JP 0070740

## (undated) DEVICE — GLOVE BIOGEL PI ULTRATOUCH G SZ 8.0 42180

## (undated) DEVICE — BALLOON EXTRACTION 15X1950MM 3.2MM TL B-V243Q-A

## (undated) DEVICE — SU SILK 2-0 FS-1 18" 685G

## (undated) DEVICE — ENDO TROCAR FIRST ENTRY KII FIOS Z-THRD 12X100MM CTF73

## (undated) DEVICE — STPL ENDO ARTICULATING 45MM EC45A

## (undated) DEVICE — WIRE GUIDE 0.025"X270CM SHORT STR VISIGLIDE 2 G-260-2527S

## (undated) DEVICE — PLATE GROUNDING ADULT W/CORD 9165L

## (undated) DEVICE — DRSG TELFA 3X4" 1050

## (undated) DEVICE — TUBING LAP SUCT/IRRIG STRYKER 250070500

## (undated) DEVICE — PREP DURAPREP 26ML APL 8630

## (undated) DEVICE — SYSTEM CLEARIFY VISUALIZATION 21-345

## (undated) DEVICE — CLIP LIGACLIP LG YELLOW LT400

## (undated) DEVICE — DRSG TEGADERM 2 3/8X2 3/4" 1624W

## (undated) DEVICE — GOWN XXL 9575

## (undated) DEVICE — CUSTOM PACK LAP CHOLE SBA5BLCHEA

## (undated) DEVICE — APPLICATOR ENDOSCOPIC 5 SURGICEL POWDER 3123SPEA

## (undated) DEVICE — SOL WATER IRRIG 1000ML BOTTLE 2F7114

## (undated) DEVICE — SURGICEL POWDER ABSORBABLE HEMOSTAT 3GM 3013SP

## (undated) DEVICE — TUBING SUCTION MEDI-VAC 1/4"X20' N620A - HE

## (undated) DEVICE — CLIP APPLIER ENDO ROTATING 10MM MED/LG ER320

## (undated) DEVICE — DRSG DRAIN 4X4" 7086

## (undated) DEVICE — SU VICRYL+ 0 27 UR6 VLT VCP603H

## (undated) DEVICE — ENDO TROCAR FIRST ENTRY KII FIOS Z-THRD 05X100MM CTF03

## (undated) DEVICE — BLADE KNIFE SURG 11 371111

## (undated) DEVICE — ENDO SHEARS RENEW LAP ENDOCUT SCISSOR TIP 16.5MM 3142

## (undated) DEVICE — SUCTION MANIFOLD NEPTUNE 2 SYS 1 PORT 702-025-000

## (undated) DEVICE — ENDO FUSION OMNI-TOME G31903

## (undated) DEVICE — SU MONOCRYL+ 4-0 18IN PS2 UND MCP496G

## (undated) DEVICE — ESU CORD MONOPOLAR 10'  E0510

## (undated) DEVICE — TUBING SMOKE EVAC PLUME PORT PP010CS

## (undated) DEVICE — CATH TRAY FOLEY SURESTEP 16FR DRAIN BAG STATOCK A899916

## (undated) RX ORDER — BUPIVACAINE HYDROCHLORIDE 2.5 MG/ML
INJECTION, SOLUTION INFILTRATION; PERINEURAL
Status: DISPENSED
Start: 2021-08-01